# Patient Record
Sex: MALE | Race: WHITE | NOT HISPANIC OR LATINO | Employment: FULL TIME | URBAN - METROPOLITAN AREA
[De-identification: names, ages, dates, MRNs, and addresses within clinical notes are randomized per-mention and may not be internally consistent; named-entity substitution may affect disease eponyms.]

---

## 2018-06-06 ENCOUNTER — HISTORICAL (OUTPATIENT)
Dept: ADMINISTRATIVE | Facility: HOSPITAL | Age: 53
End: 2018-06-06

## 2018-06-06 LAB
ALBUMIN SERPL-MCNC: 4.8 GM/DL (ref 3.4–5)
ALBUMIN/GLOB SERPL: 1.92 {RATIO} (ref 1.5–2.5)
ALP SERPL-CCNC: 67 UNIT/L (ref 38–126)
ALT SERPL-CCNC: 12 UNIT/L (ref 7–52)
AST SERPL-CCNC: 15 UNIT/L (ref 15–37)
BILIRUB SERPL-MCNC: 0.9 MG/DL (ref 0.2–1)
BILIRUBIN DIRECT+TOT PNL SERPL-MCNC: 0.2 MG/DL (ref 0–0.5)
BILIRUBIN DIRECT+TOT PNL SERPL-MCNC: 0.7 MG/DL
BUN SERPL-MCNC: 9 MG/DL (ref 7–18)
CALCIUM SERPL-MCNC: 9.4 MG/DL (ref 8.5–10)
CHLORIDE SERPL-SCNC: 101 MMOL/L (ref 98–107)
CO2 SERPL-SCNC: 31 MMOL/L (ref 21–32)
CREAT SERPL-MCNC: 0.65 MG/DL (ref 0.6–1.3)
EST. AVERAGE GLUCOSE BLD GHB EST-MCNC: 134 MG/DL
GLOBULIN SER-MCNC: 2.5 GM/DL (ref 1.2–3)
GLUCOSE SERPL-MCNC: 152 MG/DL (ref 74–106)
HBA1C MFR BLD: 6.3 % (ref 4.4–6.4)
POTASSIUM SERPL-SCNC: 4.4 MMOL/L (ref 3.5–5.1)
PROT SERPL-MCNC: 7.3 GM/DL (ref 6.4–8.2)
SODIUM SERPL-SCNC: 138 MMOL/L (ref 136–145)

## 2019-05-20 ENCOUNTER — HISTORICAL (OUTPATIENT)
Dept: ADMINISTRATIVE | Facility: HOSPITAL | Age: 54
End: 2019-05-20

## 2019-05-20 LAB
ABS NEUT (OLG): 5 X10(3)/MCL (ref 2.1–9.2)
ALBUMIN SERPL-MCNC: 4.7 GM/DL (ref 3.4–5)
ALBUMIN/GLOB SERPL: 2.35 {RATIO} (ref 1.5–2.5)
ALP SERPL-CCNC: 64 UNIT/L (ref 38–126)
ALT SERPL-CCNC: 16 UNIT/L (ref 7–52)
AST SERPL-CCNC: 16 UNIT/L (ref 15–37)
BILIRUB SERPL-MCNC: 0.7 MG/DL (ref 0.2–1)
BILIRUBIN DIRECT+TOT PNL SERPL-MCNC: 0.2 MG/DL (ref 0–0.5)
BILIRUBIN DIRECT+TOT PNL SERPL-MCNC: 0.5 MG/DL
BUN SERPL-MCNC: 10 MG/DL (ref 7–18)
CALCIUM SERPL-MCNC: 9 MG/DL (ref 8.5–10)
CHLORIDE SERPL-SCNC: 101 MMOL/L (ref 98–107)
CHOLEST SERPL-MCNC: 130 MG/DL (ref 0–200)
CHOLEST/HDLC SERPL: 3.4 {RATIO}
CO2 SERPL-SCNC: 29 MMOL/L (ref 21–32)
CREAT SERPL-MCNC: 0.69 MG/DL (ref 0.6–1.3)
CREAT UR-MCNC: 200 MG/DL
ERYTHROCYTE [DISTWIDTH] IN BLOOD BY AUTOMATED COUNT: 13.8 % (ref 11.5–17)
EST. AVERAGE GLUCOSE BLD GHB EST-MCNC: 212 MG/DL
GLOBULIN SER-MCNC: 2 GM/DL (ref 1.2–3)
GLUCOSE SERPL-MCNC: 185 MG/DL (ref 74–106)
HBA1C MFR BLD: 9 % (ref 4.4–6.4)
HCT VFR BLD AUTO: 40.3 % (ref 42–52)
HDLC SERPL-MCNC: 38 MG/DL (ref 35–60)
HGB BLD-MCNC: 13.6 GM/DL (ref 14–18)
LDLC SERPL CALC-MCNC: 78 MG/DL (ref 0–129)
LYMPHOCYTES # BLD AUTO: 2.5 X10(3)/MCL (ref 0.6–3.4)
LYMPHOCYTES NFR BLD AUTO: 30.4 % (ref 13–40)
MCH RBC QN AUTO: 27.9 PG (ref 27–31.2)
MCHC RBC AUTO-ENTMCNC: 34 GM/DL (ref 32–36)
MCV RBC AUTO: 83 FL (ref 80–94)
MICROALBUMIN UR-MCNC: 150 MG/L
MICROALBUMIN/CREAT RATIO PNL UR: ABNORMAL MG/GM
MONOCYTES # BLD AUTO: 0.6 X10(3)/MCL (ref 0.1–1.3)
MONOCYTES NFR BLD AUTO: 8 % (ref 0.1–24)
NEUTROPHILS NFR BLD AUTO: 61.6 % (ref 47–80)
PLATELET # BLD AUTO: 166 X10(3)/MCL (ref 130–400)
PMV BLD AUTO: 10.4 FL (ref 9.4–12.4)
POTASSIUM SERPL-SCNC: 4.3 MMOL/L (ref 3.5–5.1)
PROT SERPL-MCNC: 6.7 GM/DL (ref 6.4–8.2)
PSA SERPL-MCNC: 0.91 NG/ML (ref 0–3.5)
RBC # BLD AUTO: 4.87 X10(6)/MCL (ref 4.7–6.1)
SODIUM SERPL-SCNC: 138 MMOL/L (ref 136–145)
TRIGL SERPL-MCNC: 89 MG/DL (ref 30–150)
TSH SERPL-ACNC: 2.04 MIU/ML (ref 0.35–4.94)
VLDLC SERPL CALC-MCNC: 17.8 MG/DL
WBC # SPEC AUTO: 8.1 X10(3)/MCL (ref 4.5–11.5)

## 2019-09-03 ENCOUNTER — HISTORICAL (OUTPATIENT)
Dept: ADMINISTRATIVE | Facility: HOSPITAL | Age: 54
End: 2019-09-03

## 2019-09-03 LAB
ALBUMIN SERPL-MCNC: 4.7 GM/DL (ref 3.4–5)
ALBUMIN/GLOB SERPL: 1.88 {RATIO} (ref 1.5–2.5)
ALP SERPL-CCNC: 58 UNIT/L (ref 38–126)
ALT SERPL-CCNC: 14 UNIT/L (ref 7–52)
AST SERPL-CCNC: 14 UNIT/L (ref 15–37)
BILIRUB SERPL-MCNC: 0.6 MG/DL (ref 0.2–1)
BILIRUBIN DIRECT+TOT PNL SERPL-MCNC: 0.1 MG/DL (ref 0–0.5)
BILIRUBIN DIRECT+TOT PNL SERPL-MCNC: 0.5 MG/DL
BUN SERPL-MCNC: 30 MG/DL (ref 7–18)
CALCIUM SERPL-MCNC: 8.9 MG/DL (ref 8.5–10)
CHLORIDE SERPL-SCNC: 101 MMOL/L (ref 98–107)
CO2 SERPL-SCNC: 27 MMOL/L (ref 21–32)
CREAT SERPL-MCNC: 1.13 MG/DL (ref 0.6–1.3)
EST. AVERAGE GLUCOSE BLD GHB EST-MCNC: 206 MG/DL
GLOBULIN SER-MCNC: 2.5 GM/DL (ref 1.2–3)
GLUCOSE SERPL-MCNC: 171 MG/DL (ref 74–106)
HBA1C MFR BLD: 8.8 % (ref 4.4–6.4)
POTASSIUM SERPL-SCNC: 4.2 MMOL/L (ref 3.5–5.1)
PROT SERPL-MCNC: 7.2 GM/DL (ref 6.4–8.2)
SODIUM SERPL-SCNC: 135 MMOL/L (ref 136–145)

## 2020-01-22 ENCOUNTER — HISTORICAL (OUTPATIENT)
Dept: ADMINISTRATIVE | Facility: HOSPITAL | Age: 55
End: 2020-01-22

## 2020-01-22 LAB
ALBUMIN SERPL-MCNC: 4.3 GM/DL (ref 3.4–5)
ALBUMIN/GLOB SERPL: 1.95 {RATIO} (ref 1.5–2.5)
ALP SERPL-CCNC: 62 UNIT/L (ref 38–126)
ALT SERPL-CCNC: 17 UNIT/L (ref 7–52)
AST SERPL-CCNC: 18 UNIT/L (ref 15–37)
BILIRUB SERPL-MCNC: 0.6 MG/DL (ref 0.2–1)
BILIRUBIN DIRECT+TOT PNL SERPL-MCNC: 0.1 MG/DL (ref 0–0.5)
BILIRUBIN DIRECT+TOT PNL SERPL-MCNC: 0.5 MG/DL
BUN SERPL-MCNC: 15 MG/DL (ref 7–18)
CALCIUM SERPL-MCNC: 9.1 MG/DL (ref 8.5–10)
CHLORIDE SERPL-SCNC: 99 MMOL/L (ref 98–107)
CO2 SERPL-SCNC: 32 MMOL/L (ref 21–32)
CREAT SERPL-MCNC: 0.94 MG/DL (ref 0.6–1.3)
EST. AVERAGE GLUCOSE BLD GHB EST-MCNC: 148 MG/DL
GLOBULIN SER-MCNC: 2.2 GM/DL (ref 1.2–3)
GLUCOSE SERPL-MCNC: 117 MG/DL (ref 74–106)
HBA1C MFR BLD: 6.8 % (ref 4.4–6.4)
POTASSIUM SERPL-SCNC: 3.9 MMOL/L (ref 3.5–5.1)
PROT SERPL-MCNC: 6.5 GM/DL (ref 6.4–8.2)
SODIUM SERPL-SCNC: 137 MMOL/L (ref 136–145)

## 2020-05-27 ENCOUNTER — HISTORICAL (OUTPATIENT)
Dept: ADMINISTRATIVE | Facility: HOSPITAL | Age: 55
End: 2020-05-27

## 2020-05-27 LAB
ABS NEUT (OLG): 4.2 X10(3)/MCL (ref 2.1–9.2)
ALBUMIN SERPL-MCNC: 4.5 GM/DL (ref 3.4–5)
ALBUMIN/GLOB SERPL: 1.73 {RATIO} (ref 1.5–2.5)
ALP SERPL-CCNC: 61 UNIT/L (ref 38–126)
ALT SERPL-CCNC: 15 UNIT/L (ref 7–52)
AST SERPL-CCNC: 18 UNIT/L (ref 15–37)
BILIRUB SERPL-MCNC: 0.6 MG/DL (ref 0.2–1)
BILIRUBIN DIRECT+TOT PNL SERPL-MCNC: 0.2 MG/DL (ref 0–0.5)
BILIRUBIN DIRECT+TOT PNL SERPL-MCNC: 0.4 MG/DL
BUN SERPL-MCNC: 23 MG/DL (ref 7–18)
CALCIUM SERPL-MCNC: 9.1 MG/DL (ref 8.5–10)
CHLORIDE SERPL-SCNC: 104 MMOL/L (ref 98–107)
CHOLEST SERPL-MCNC: 115 MG/DL (ref 0–200)
CHOLEST/HDLC SERPL: 3.3 {RATIO}
CO2 SERPL-SCNC: 30 MMOL/L (ref 21–32)
CREAT SERPL-MCNC: 1.18 MG/DL (ref 0.6–1.3)
CREAT UR-MCNC: 50 MG/DL
ERYTHROCYTE [DISTWIDTH] IN BLOOD BY AUTOMATED COUNT: 13.3 % (ref 11.5–17)
EST. AVERAGE GLUCOSE BLD GHB EST-MCNC: 160 MG/DL
FERRITIN SERPL-MCNC: 272.31 NG/ML (ref 21.81–274.66)
GLOBULIN SER-MCNC: 2.6 GM/DL (ref 1.2–3)
GLUCOSE SERPL-MCNC: 115 MG/DL (ref 74–106)
HBA1C MFR BLD: 7.2 % (ref 4.4–6.4)
HCT VFR BLD AUTO: 35.4 % (ref 42–52)
HDLC SERPL-MCNC: 35 MG/DL (ref 35–60)
HGB BLD-MCNC: 11.8 GM/DL (ref 14–18)
IRON SATN MFR SERPL: 22 % (ref 20–50)
IRON SERPL-MCNC: 60 UG/DL (ref 65–175)
LDLC SERPL CALC-MCNC: 56 MG/DL (ref 0–129)
LYMPHOCYTES # BLD AUTO: 1.8 X10(3)/MCL (ref 0.6–3.4)
LYMPHOCYTES NFR BLD AUTO: 26.9 % (ref 13–40)
MCH RBC QN AUTO: 27.7 PG (ref 27–31.2)
MCHC RBC AUTO-ENTMCNC: 33 GM/DL (ref 32–36)
MCV RBC AUTO: 83 FL (ref 80–94)
MICROALBUMIN UR-MCNC: 150 MG/L
MICROALBUMIN/CREAT RATIO PNL UR: >300 MG/GM
MONOCYTES # BLD AUTO: 0.6 X10(3)/MCL (ref 0.1–1.3)
MONOCYTES NFR BLD AUTO: 9.6 % (ref 0.1–24)
NEUTROPHILS NFR BLD AUTO: 63.5 % (ref 47–80)
PLATELET # BLD AUTO: 178 X10(3)/MCL (ref 130–400)
PMV BLD AUTO: 11.1 FL (ref 9.4–12.4)
POTASSIUM SERPL-SCNC: 4.5 MMOL/L (ref 3.5–5.1)
PROT SERPL-MCNC: 7.1 GM/DL (ref 6.4–8.2)
PSA SERPL-MCNC: 0.92 NG/ML (ref 0–3.5)
RBC # BLD AUTO: 4.26 X10(6)/MCL (ref 4.7–6.1)
RET# (OHS): 0.06 X10^6/ML (ref 0.03–0.1)
RETICULOCYTE COUNT AUTOMATED (OLG): 1.4 % (ref 1.1–2.1)
SODIUM SERPL-SCNC: 141 MMOL/L (ref 136–145)
TIBC SERPL-MCNC: 219 UG/DL (ref 69–240)
TIBC SERPL-MCNC: 279 UG/DL (ref 250–450)
TRANSFERRIN SERPL-MCNC: 254 MG/DL (ref 174–364)
TRIGL SERPL-MCNC: 180 MG/DL (ref 30–150)
TSH SERPL-ACNC: 1.56 MIU/ML (ref 0.35–4.94)
VLDLC SERPL CALC-MCNC: 36 MG/DL
WBC # SPEC AUTO: 6.6 X10(3)/MCL (ref 4.5–11.5)

## 2020-07-01 ENCOUNTER — HISTORICAL (OUTPATIENT)
Dept: ADMINISTRATIVE | Facility: HOSPITAL | Age: 55
End: 2020-07-01

## 2020-07-01 LAB
HEMOCCULT SP1 STL QL: NEGATIVE
HEMOCCULT SP2 STL QL: NEGATIVE

## 2020-08-10 ENCOUNTER — HISTORICAL (OUTPATIENT)
Dept: ADMINISTRATIVE | Facility: HOSPITAL | Age: 55
End: 2020-08-10

## 2020-08-10 LAB
ABS NEUT (OLG): 4.1 X10(3)/MCL (ref 2.1–9.2)
ALBUMIN SERPL-MCNC: 4.7 GM/DL (ref 3.4–5)
ALBUMIN/GLOB SERPL: 1.96 {RATIO} (ref 1.5–2.5)
ALP SERPL-CCNC: 65 UNIT/L (ref 38–126)
ALT SERPL-CCNC: 14 UNIT/L (ref 7–52)
AST SERPL-CCNC: 15 UNIT/L (ref 15–37)
BILIRUB SERPL-MCNC: 0.4 MG/DL (ref 0.2–1)
BILIRUBIN DIRECT+TOT PNL SERPL-MCNC: 0.1 MG/DL (ref 0–0.5)
BILIRUBIN DIRECT+TOT PNL SERPL-MCNC: 0.3 MG/DL
BUN SERPL-MCNC: 26 MG/DL (ref 7–18)
CALCIUM SERPL-MCNC: 9.3 MG/DL (ref 8.5–10.1)
CHLORIDE SERPL-SCNC: 103 MMOL/L (ref 98–107)
CO2 SERPL-SCNC: 28 MMOL/L (ref 21–32)
CREAT SERPL-MCNC: 1.45 MG/DL (ref 0.6–1.3)
ERYTHROCYTE [DISTWIDTH] IN BLOOD BY AUTOMATED COUNT: 12.9 % (ref 11.5–17)
EST. AVERAGE GLUCOSE BLD GHB EST-MCNC: 148 MG/DL
GLOBULIN SER-MCNC: 2.4 GM/DL (ref 1.2–3)
GLUCOSE SERPL-MCNC: 146 MG/DL (ref 74–106)
HBA1C MFR BLD: 6.8 % (ref 4.4–6.4)
HCT VFR BLD AUTO: 34.9 % (ref 42–52)
HGB BLD-MCNC: 11.5 GM/DL (ref 14–18)
LYMPHOCYTES # BLD AUTO: 2.5 X10(3)/MCL (ref 0.6–3.4)
LYMPHOCYTES NFR BLD AUTO: 33.5 % (ref 13–40)
MCH RBC QN AUTO: 27.8 PG (ref 27–31.2)
MCHC RBC AUTO-ENTMCNC: 33 GM/DL (ref 32–36)
MCV RBC AUTO: 84 FL (ref 80–94)
MONOCYTES # BLD AUTO: 0.8 X10(3)/MCL (ref 0.1–1.3)
MONOCYTES NFR BLD AUTO: 10.2 % (ref 0.1–24)
NEUTROPHILS NFR BLD AUTO: 56.3 % (ref 47–80)
PLATELET # BLD AUTO: 185 X10(3)/MCL (ref 130–400)
PMV BLD AUTO: 10.8 FL (ref 9.4–12.4)
POTASSIUM SERPL-SCNC: 4.7 MMOL/L (ref 3.5–5.1)
PROT SERPL-MCNC: 7.1 GM/DL (ref 6.4–8.2)
RBC # BLD AUTO: 4.13 X10(6)/MCL (ref 4.7–6.1)
SODIUM SERPL-SCNC: 139 MMOL/L (ref 136–145)
WBC # SPEC AUTO: 7.4 X10(3)/MCL (ref 4.5–11.5)

## 2020-08-17 ENCOUNTER — HISTORICAL (OUTPATIENT)
Dept: ADMINISTRATIVE | Facility: HOSPITAL | Age: 55
End: 2020-08-17

## 2020-08-17 LAB
FERRITIN SERPL-MCNC: 239.57 NG/ML (ref 21.81–274.66)
IRON SATN MFR SERPL: 21 % (ref 20–50)
IRON SERPL-MCNC: 58 UG/DL (ref 65–175)
RET# (OHS): 0.05 X10^6/ML (ref 0.03–0.1)
RETICULOCYTE COUNT AUTOMATED (OLG): 1.2 % (ref 1.1–2.1)
TIBC SERPL-MCNC: 217 UG/DL (ref 69–240)
TIBC SERPL-MCNC: 275 UG/DL (ref 250–450)
TRANSFERRIN SERPL-MCNC: 251 MG/DL (ref 174–364)

## 2020-12-01 ENCOUNTER — HISTORICAL (OUTPATIENT)
Dept: ADMINISTRATIVE | Facility: HOSPITAL | Age: 55
End: 2020-12-01

## 2020-12-01 LAB
ABS NEUT (OLG): 4.7 X10(3)/MCL (ref 2.1–9.2)
ALBUMIN SERPL-MCNC: 4.6 GM/DL (ref 3.4–5)
ALBUMIN/GLOB SERPL: 1.7 {RATIO} (ref 1.5–2.5)
ALP SERPL-CCNC: 77 UNIT/L (ref 38–126)
ALT SERPL-CCNC: 11 UNIT/L (ref 7–52)
AST SERPL-CCNC: 14 UNIT/L (ref 15–37)
BILIRUB SERPL-MCNC: 0.7 MG/DL (ref 0.2–1)
BILIRUBIN DIRECT+TOT PNL SERPL-MCNC: 0.1 MG/DL (ref 0–0.5)
BILIRUBIN DIRECT+TOT PNL SERPL-MCNC: 0.6 MG/DL
BUN SERPL-MCNC: 21 MG/DL (ref 7–18)
CALCIUM SERPL-MCNC: 9.6 MG/DL (ref 8.5–10.1)
CHLORIDE SERPL-SCNC: 102 MMOL/L (ref 98–107)
CO2 SERPL-SCNC: 29 MMOL/L (ref 21–32)
CREAT SERPL-MCNC: 1.29 MG/DL (ref 0.6–1.3)
ERYTHROCYTE [DISTWIDTH] IN BLOOD BY AUTOMATED COUNT: 12.8 % (ref 11.5–17)
EST. AVERAGE GLUCOSE BLD GHB EST-MCNC: 140 MG/DL
GLOBULIN SER-MCNC: 2.7 GM/DL (ref 1.2–3)
GLUCOSE SERPL-MCNC: 175 MG/DL (ref 74–106)
HBA1C MFR BLD: 6.5 % (ref 4.4–6.4)
HCT VFR BLD AUTO: 38.3 % (ref 42–52)
HGB BLD-MCNC: 12.5 GM/DL (ref 14–18)
LYMPHOCYTES # BLD AUTO: 1.9 X10(3)/MCL (ref 0.6–3.4)
LYMPHOCYTES NFR BLD AUTO: 26.4 % (ref 13–40)
MCH RBC QN AUTO: 27.5 PG (ref 27–31.2)
MCHC RBC AUTO-ENTMCNC: 33 GM/DL (ref 32–36)
MCV RBC AUTO: 84 FL (ref 80–94)
MONOCYTES # BLD AUTO: 0.6 X10(3)/MCL (ref 0.1–1.3)
MONOCYTES NFR BLD AUTO: 8.7 % (ref 0.1–24)
NEUTROPHILS NFR BLD AUTO: 64.9 % (ref 47–80)
PLATELET # BLD AUTO: 175 X10(3)/MCL (ref 130–400)
PMV BLD AUTO: 10.8 FL (ref 9.4–12.4)
POTASSIUM SERPL-SCNC: 4.8 MMOL/L (ref 3.5–5.1)
PROT SERPL-MCNC: 7.3 GM/DL (ref 6.4–8.2)
RBC # BLD AUTO: 4.54 X10(6)/MCL (ref 4.7–6.1)
SODIUM SERPL-SCNC: 139 MMOL/L (ref 136–145)
WBC # SPEC AUTO: 7.2 X10(3)/MCL (ref 4.5–11.5)

## 2021-06-10 ENCOUNTER — HISTORICAL (OUTPATIENT)
Dept: ADMINISTRATIVE | Facility: HOSPITAL | Age: 56
End: 2021-06-10

## 2021-06-10 LAB
ABS NEUT (OLG): 5.5 X10(3)/MCL (ref 2.1–9.2)
ALBUMIN SERPL-MCNC: 4.8 GM/DL (ref 3.4–5)
ALBUMIN/GLOB SERPL: 1.92 {RATIO} (ref 1.5–2.5)
ALP SERPL-CCNC: 69 UNIT/L (ref 38–126)
ALT SERPL-CCNC: 12 UNIT/L (ref 7–52)
AST SERPL-CCNC: 14 UNIT/L (ref 15–37)
BILIRUB SERPL-MCNC: 0.6 MG/DL (ref 0.2–1)
BILIRUBIN DIRECT+TOT PNL SERPL-MCNC: 0.1 MG/DL (ref 0–0.5)
BILIRUBIN DIRECT+TOT PNL SERPL-MCNC: 0.5 MG/DL
BUN SERPL-MCNC: 31 MG/DL (ref 7–18)
CALCIUM SERPL-MCNC: 9.3 MG/DL (ref 8.5–10.1)
CHLORIDE SERPL-SCNC: 101 MMOL/L (ref 98–107)
CHOLEST SERPL-MCNC: 128 MG/DL (ref 0–200)
CHOLEST/HDLC SERPL: 3.5 {RATIO}
CO2 SERPL-SCNC: 28 MMOL/L (ref 21–32)
CREAT SERPL-MCNC: 1.45 MG/DL (ref 0.6–1.3)
CREAT UR-MCNC: 200 MG/DL
ERYTHROCYTE [DISTWIDTH] IN BLOOD BY AUTOMATED COUNT: 12.8 % (ref 11.5–17)
EST. AVERAGE GLUCOSE BLD GHB EST-MCNC: 134 MG/DL
GLOBULIN SER-MCNC: 2.6 GM/DL (ref 1.2–3)
GLUCOSE SERPL-MCNC: 119 MG/DL (ref 74–106)
HBA1C MFR BLD: 6.3 % (ref 4.4–6.4)
HCT VFR BLD AUTO: 34.7 % (ref 42–52)
HDLC SERPL-MCNC: 37 MG/DL (ref 35–60)
HGB BLD-MCNC: 11.5 GM/DL (ref 14–18)
LDLC SERPL CALC-MCNC: 63 MG/DL (ref 0–129)
LYMPHOCYTES # BLD AUTO: 2 X10(3)/MCL (ref 0.6–3.4)
LYMPHOCYTES NFR BLD AUTO: 24 % (ref 13–40)
MCH RBC QN AUTO: 28 PG (ref 27–31.2)
MCHC RBC AUTO-ENTMCNC: 33 GM/DL (ref 32–36)
MCV RBC AUTO: 84 FL (ref 80–94)
MICROALBUMIN UR-MCNC: 150 MG/L
MICROALBUMIN/CREAT RATIO PNL UR: ABNORMAL MG/GM
MONOCYTES # BLD AUTO: 0.7 X10(3)/MCL (ref 0.1–1.3)
MONOCYTES NFR BLD AUTO: 8.4 % (ref 0.1–24)
NEUTROPHILS NFR BLD AUTO: 67.6 % (ref 47–80)
PLATELET # BLD AUTO: 170 X10(3)/MCL (ref 130–400)
PMV BLD AUTO: 10.6 FL (ref 9.4–12.4)
POTASSIUM SERPL-SCNC: 4.3 MMOL/L (ref 3.5–5.1)
PROT SERPL-MCNC: 7.3 GM/DL (ref 6.4–8.2)
PSA SERPL-MCNC: 1.06 NG/ML (ref 0–3.5)
RBC # BLD AUTO: 4.11 X10(6)/MCL (ref 4.7–6.1)
SODIUM SERPL-SCNC: 141 MMOL/L (ref 136–145)
TESTOST SERPL-MCNC: 240 NG/DL (ref 300–1060)
TRIGL SERPL-MCNC: 234 MG/DL (ref 30–150)
TSH SERPL-ACNC: 1.72 MIU/ML (ref 0.35–4.94)
VLDLC SERPL CALC-MCNC: 46.8 MG/DL
WBC # SPEC AUTO: 8.2 X10(3)/MCL (ref 4.5–11.5)

## 2021-07-13 ENCOUNTER — HISTORICAL (OUTPATIENT)
Dept: ADMINISTRATIVE | Facility: HOSPITAL | Age: 56
End: 2021-07-13

## 2021-07-13 LAB
BUN SERPL-MCNC: 29 MG/DL (ref 7–18)
CALCIUM SERPL-MCNC: 9.4 MG/DL (ref 8.5–10.1)
CHLORIDE SERPL-SCNC: 100 MMOL/L (ref 98–107)
CO2 SERPL-SCNC: 29 MMOL/L (ref 21–32)
CREAT SERPL-MCNC: 1.71 MG/DL (ref 0.6–1.3)
CREAT/UREA NIT SERPL: 17
GLUCOSE SERPL-MCNC: 136 MG/DL (ref 74–106)
POTASSIUM SERPL-SCNC: 4.6 MMOL/L (ref 3.5–5.1)
SODIUM SERPL-SCNC: 139 MMOL/L (ref 136–145)
TESTOST SERPL-MCNC: 291 NG/DL (ref 300–1060)

## 2021-08-04 ENCOUNTER — HISTORICAL (OUTPATIENT)
Dept: ADMINISTRATIVE | Facility: HOSPITAL | Age: 56
End: 2021-08-04

## 2021-08-04 LAB
APPEARANCE, UA: CLEAR
BACTERIA #/AREA URNS AUTO: ABNORMAL /HPF
BILIRUB UR QL STRIP: NEGATIVE MG/DL
BUN SERPL-MCNC: 13 MG/DL (ref 7–18)
CALCIUM SERPL-MCNC: 8.8 MG/DL (ref 8.5–10.1)
CHLORIDE SERPL-SCNC: 104 MMOL/L (ref 98–107)
CO2 SERPL-SCNC: 30 MMOL/L (ref 21–32)
COLOR UR: YELLOW
CREAT SERPL-MCNC: 1.23 MG/DL (ref 0.6–1.3)
CREAT/UREA NIT SERPL: 10.6
GLUCOSE (UA): ABNORMAL MG/DL
GLUCOSE SERPL-MCNC: 106 MG/DL (ref 74–106)
HGB UR QL STRIP: ABNORMAL UNIT/L
KETONES UR QL STRIP: ABNORMAL MG/DL
LEUKOCYTE ESTERASE UR QL STRIP: NEGATIVE UNIT/L
NITRITE UR QL STRIP.AUTO: NEGATIVE
PH UR STRIP: 5 [PH]
POTASSIUM SERPL-SCNC: 4.4 MMOL/L (ref 3.5–5.1)
PROT UR QL STRIP: ABNORMAL MG/DL
RBC #/AREA URNS HPF: ABNORMAL /HPF
SODIUM SERPL-SCNC: 141 MMOL/L (ref 136–145)
SP GR UR STRIP: 1.02
SQUAMOUS EPITHELIAL, UA: ABNORMAL /LPF
UROBILINOGEN UR STRIP-ACNC: 0.2 MG/DL
WBC #/AREA URNS AUTO: ABNORMAL /[HPF]

## 2021-12-08 ENCOUNTER — HISTORICAL (OUTPATIENT)
Dept: ADMINISTRATIVE | Facility: HOSPITAL | Age: 56
End: 2021-12-08

## 2021-12-08 LAB
ABS NEUT (OLG): 4.3 X10(3)/MCL (ref 2.1–9.2)
ALBUMIN SERPL-MCNC: 4.7 GM/DL (ref 3.4–5)
ALBUMIN/GLOB SERPL: 1.96 {RATIO} (ref 1.5–2.5)
ALP SERPL-CCNC: 65 UNIT/L (ref 38–126)
ALT SERPL-CCNC: 16 UNIT/L (ref 7–52)
AST SERPL-CCNC: 18 UNIT/L (ref 15–37)
BILIRUB SERPL-MCNC: 0.4 MG/DL (ref 0.2–1)
BILIRUBIN DIRECT+TOT PNL SERPL-MCNC: 0.1 MG/DL (ref 0–0.5)
BILIRUBIN DIRECT+TOT PNL SERPL-MCNC: 0.3 MG/DL
BUN SERPL-MCNC: 20 MG/DL (ref 7–18)
CALCIUM SERPL-MCNC: 9.6 MG/DL (ref 8.5–10.1)
CHLORIDE SERPL-SCNC: 100 MMOL/L (ref 98–107)
CHOLEST SERPL-MCNC: 125 MG/DL (ref 0–200)
CHOLEST/HDLC SERPL: 3.3 {RATIO}
CO2 SERPL-SCNC: 31 MMOL/L (ref 21–32)
CREAT SERPL-MCNC: 1.37 MG/DL (ref 0.6–1.3)
ERYTHROCYTE [DISTWIDTH] IN BLOOD BY AUTOMATED COUNT: 13.6 % (ref 11.5–17)
EST CREAT CLEARANCE SER (OHS): 78.77 ML/MIN
EST. AVERAGE GLUCOSE BLD GHB EST-MCNC: 146 MG/DL
GLOBULIN SER-MCNC: 2.4 GM/DL (ref 1.2–3)
GLUCOSE SERPL-MCNC: 147 MG/DL (ref 74–106)
HBA1C MFR BLD: 6.7 % (ref 4.4–6.4)
HCT VFR BLD AUTO: 41.3 % (ref 42–52)
HDLC SERPL-MCNC: 38 MG/DL (ref 35–60)
HGB BLD-MCNC: 12.8 GM/DL (ref 14–18)
LDLC SERPL CALC-MCNC: 56 MG/DL (ref 0–129)
LYMPHOCYTES # BLD AUTO: 1.7 X10(3)/MCL (ref 0.6–3.4)
LYMPHOCYTES NFR BLD AUTO: 26.5 % (ref 13–40)
MCH RBC QN AUTO: 26 PG (ref 27–31.2)
MCHC RBC AUTO-ENTMCNC: 31 GM/DL (ref 32–36)
MCV RBC AUTO: 84 FL (ref 80–94)
MONOCYTES # BLD AUTO: 0.6 X10(3)/MCL (ref 0.1–1.3)
MONOCYTES NFR BLD AUTO: 9.7 % (ref 0.1–24)
NEUTROPHILS NFR BLD AUTO: 63.8 % (ref 47–80)
PLATELET # BLD AUTO: 172 X10(3)/MCL (ref 130–400)
PMV BLD AUTO: 11.5 FL (ref 9.4–12.4)
POTASSIUM SERPL-SCNC: 5.1 MMOL/L (ref 3.5–5.1)
PROT SERPL-MCNC: 7.1 GM/DL (ref 6.4–8.2)
RBC # BLD AUTO: 4.92 X10(6)/MCL (ref 4.7–6.1)
SODIUM SERPL-SCNC: 139 MMOL/L (ref 136–145)
TESTOST SERPL-MCNC: 263 NG/DL (ref 300–1060)
TRIGL SERPL-MCNC: 240 MG/DL (ref 30–150)
VLDLC SERPL CALC-MCNC: 48 MG/DL
WBC # SPEC AUTO: 6.6 X10(3)/MCL (ref 4.5–11.5)

## 2022-05-02 NOTE — HISTORICAL [facility]
This is a historical note converted from Rey. Formatting and pictures may have been removed.  Please reference Rey for original formatting and attached multimedia. Chief Complaint  cpx fasting  History of Present Illness  53?year old male presents for wellness visit.  Blood Pressure - 162/102  BMI - 27.82  Immunizations - Denies Pneumonia vaccine  Prostate?Cancer Screening - PSA and DENNIS today  Colon Cancer Screening - Denies  Diet - Average  Exercise - Walking intermittently  HTN -? BP consistently elevated 150/80 average at home?despite compliance with lisinopril 10 mg daily.  DM II - HbA1C 6.3 June 2018, Pt saw podiatrist, Dr. Barnes?in Feb and had foot exam performed and dx with peripheral neuropathy.? Patient was started on gabapentin?although had to discontinue due to?decreased?focus and attention which was affecting work performance?and symptoms resolved?since discontinuing gabapentin.? Continued?burning bilateral plantar foot pain?which occurs?throughout the day.  Fasting -180.? Compliant with Metformin.? Tolerating well without side effects.?  GERD - Controlled with omeprazole.??Patient to taper off omeprazole although began to experience?pyrosis daily.  Hyperchol - Last LDL 81 2/22/18.? Tolerating Crestor 20 mg daily well without any side effects.  Chronic?bilateral calf?myalgias.? Denies any?symptoms of claudication?or edema?associated.  Review of Systems  Constitutional:?No weight loss, no fever, no fatigue, no chills, no night sweats,?no weakness  Eyes:?No blurred vision,?no redness,?no drainage,?no ocular?pain  HEENT:?No sore throat,?no ear pain, no sinus pressure, no nasal congestion, no rhinorrhea, no postnasal drip  Respiratory:?No cough, no wheezing, no sputum production, no shortness of breath  Cardiovascular:?No chest pain, no palpitations, no dyspnea on exertion,?no orthopnea  Gastrointestinal:?No nausea, no vomiting, no abdominal pain, no diarrhea,?no constipation, no melena,?no  hematochezia  Genitourinary:?No dysuria, no hematuria, no frequency, no urgency, no incontinence, no discharge  Musculoskeletal:?myalgias, no arthralgias, no weakness, no joint effusion, no edema  Integumentary:?No rashes, no hives, no itching, no lesions, no jaundice  Neurologic:?No headaches, no numbness, no tingling, no weakness, no dizziness  Psychiatric:?No anxiety, no irritability, no depression,?no suicidal ideations, no?homicidal ideations,?no delusions, no hallucinations  Endocrine:?No polyuria, no polydipsia, no polyphagia  Hematology:?No bruising, no lymphadenopathy,?no paleness  ?  Physical Exam  Vitals & Measurements  HR:?70(Peripheral)? BP:?162/102?  HT:?185?cm? WT:?95.2?kg? BMI:?27.82?  General:?Well developed, Well-nourished, in No acute distress, A&O x 4  Eye:?PERRLA, EOMI, Clear conjunctiva, Eyelids unremarkable  Ears:?Bilateral EAC clear?and?Bilateral TM clear  Nose:? Mucosa normal, No rhinorrhea, No lesions  O/P:??No?erythema,?No tonsillar hypertrophy,?No tonsillar exudates,?No cobblestoning  Neck:?Soft, Nontender, No thyromegaly, Full range of motion, No cervical lymphadenopathy, No JVD  Cardiovascular:?Regular rate and rhythm, No murmurs, No gallops, No rubs  Respiratory:?Clear to auscultation bilaterally, No wheezes, No rhonchi, No?crackles  Abdomen:? Soft, Nontender, No hepatosplenomegaly, Normal and equal bowel sounds, No masses, No rebound, No guarding  Musculoskeletal:? bilateral calf tenderness, FROM, No joint abnormality, No clubbing, No cyanosis,No?edema  Neurologic:? No motor or sensory deficits, Reflexes 2+ throughout, CN II-XII grossly intact  Integumentary:?No rashes or skin lesions  DENNIS - No anal growths or lesions, No rectal masses, Normal sphincter tone, No prostate nodules,? Symmetric, No tenderness,? prostate firm, prostate [id number]  Assessment/Plan  1.?Wellness examination?Z00.00  ?CBC, CMP, FLP, TSH today  Discussed the?importance of maintaining a balanced diet and regular  exercise  Ordered:  Comprehensive Metabolic Panel, Routine collect, 05/20/19 10:28:00 CDT, Blood, Stop date 05/20/19 10:28:00 CDT, Lab Collect, Wellness examination, 05/20/19 10:28:00 CDT  External Referral, Screening Colonoscopy, GI, Dr. Salazar, 05/20/19 10:25:00 CDT, Wellness examination  Lipid Panel, Routine collect, 05/20/19 10:28:00 CDT, Blood, Stop date 05/20/19 10:28:00 CDT, Lab Collect, Wellness examination, 05/20/19 10:28:00 CDT  Preventative Health Care Est 40-64 years [id number] PC, Wellness examination, [facility], 05/20/19 10:25:00 CDT  ?  2.?DMII (diabetes mellitus, type 2)?E11.9  ?Hemoglobin A1c and microalbumin today  Ordered:  Office/Outpatient Visit Level 3 Established [id number] PC, DMII (diabetes mellitus, type 2)  HTN (hypertension)  Hypercholesterolemia  GERD - Gastro-esophageal reflux disease  Calf pain  Peripheral neuropathy, [facility], 05/20/19 10:25:00 CDT  ?  3.?HTN (hypertension)?I10  ?Increase lisinopril to 20 mg daily  Ordered:  Clinic Follow up, *Est. 06/20/19 3:00:00 CDT, Order for future visit, Peripheral neuropathy  HTN (hypertension), [facility]  Office/Outpatient Visit Level 3 Established [id number] PC, DMII (diabetes mellitus, type 2)  HTN (hypertension)  Hypercholesterolemia  GERD - Gastro-esophageal reflux disease  Calf pain  Peripheral neuropathy, [facility], 05/20/19 10:25:00 CDT  ?  4.?Hypercholesterolemia?E78.00  ?CMP and FLP today  Ordered:  Office/Outpatient Visit Level 3 Established [id number] PC, DMII (diabetes mellitus, type 2)  HTN (hypertension)  Hypercholesterolemia  GERD - Gastro-esophageal reflux disease  Calf pain  Peripheral neuropathy, [facility], 05/20/19 10:25:00 CDT  ?  5.?GERD - Gastro-esophageal reflux disease?K21.9  ?Continue omeprazole 40 mg daily  Ordered:  Office/Outpatient Visit Level 3 Established [id number] PC, DMII (diabetes mellitus, type 2)  HTN (hypertension)  Hypercholesterolemia  GERD - Gastro-esophageal reflux disease  Calf pain   Peripheral neuropathy, [facility], 05/20/19 10:25:00 CDT  ?  6.?Immunization due?Z23  Pneumovax today  ?  7.?Calf pain?M79.669  ?CBC, CMP and TSH today  Ordered:  Office/Outpatient Visit Level 3 Established [id number] PC, DMII (diabetes mellitus, type 2)  HTN (hypertension)  Hypercholesterolemia  GERD - Gastro-esophageal reflux disease  Calf pain  Peripheral neuropathy, [facility], 05/20/19 10:25:00 CDT  ?  8.?Prostate cancer screening?Z12.5  ?PSA and DENNIS today  ?  9.?Peripheral neuropathy?G62.9  ?Trial of?Lyrica 50 mg?twice daily to 3 times daily?and patient to call with?response  Ordered:  Clinic Follow up, *Est. 06/20/19 3:00:00 CDT, Order for future visit, Peripheral neuropathy  HTN (hypertension), [facility]  Office/Outpatient Visit Level 3 Established [id number] PC, DMII (diabetes mellitus, type 2)  HTN (hypertension)  Hypercholesterolemia  GERD - Gastro-esophageal reflux disease  Calf pain  Peripheral neuropathy, [facility], 05/20/19 10:25:00 CDT  ?  Orders:  lisinopril, 20 mg = 1 tab(s), Oral, Daily, # 90 tab(s), 3 Refill(s), Pharmacy: [facility] [place], [place]  metFORMIN, 1,000 mg = 1 tab(s), Oral, BID, # 180 tab(s), 3 Refill(s), Pharmacy: [facility] - [place], [place]  omeprazole, 40 mg = 1 cap(s), Oral, Daily, # 90 cap(s), 3 Refill(s), Pharmacy: [facility] [place], [place]  rosuvastatin, 20 mg = 1 tab(s), Oral, Daily, # 90 tab(s), 3 Refill(s), Pharmacy: [facility] [place], [place]  Referrals  External Referral, Screening Colonoscopy, GI, Dr. Salazar, 05/20/19 10:25:00 CDT, Wellness examination  Clinic Follow up, *Est. 06/20/19 3:00:00 CDT, Order for future visit, Peripheral neuropathy  HTN (hypertension), [facility]   Problem List/Past Medical History  Ongoing  Allergic rhinitis  DMII (diabetes mellitus, type 2)  GERD - Gastro-esophageal reflux disease  HTN (hypertension)  Hypercholesterolemia  LVH (left ventricular hypertrophy)  Historical  No qualifying  data  Procedure/Surgical History  cyst removed finger   Medications  Aleve 220 mg oral tablet, 1 cap(s), Oral, q8hr, PRN  aspirin 81 mg oral Delayed Release (EC) tablet, 81 mg= 1 tab(s), Oral, Daily  Centrum Silver oral tablet, 1 tab(s), Oral, Daily  garlic  lisinopril 20 mg oral tablet, 20 mg= 1 tab(s), Oral, Daily, 3 refills  metFORMIN 1000 mg oral tablet, 1000 mg= 1 tab(s), Oral, BID, 3 refills  omeprazole 40 mg oral DR capsule, 40 mg= 1 cap(s), Oral, Daily, 3 refills  rosuvastatin 20 mg oral tablet, 20 mg= 1 tab(s), Oral, Daily, 3 refills  Allergies  No Known Allergies  Social History  Alcohol  Beer, Liquor, 1-2 times per year, 02/22/2018  Employment/School  Employed, 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  CAD - Coronary artery disease: Mother.  Diabetes mellitus type 2: Mother and Father.  Hypertension.: Father.  Pulmonary embolism: Father.  Immunizations  Vaccine Date Status   pneumococcal 23-polyvalent vaccine 05/20/2019 Given   tetanus/diphtheria/pertussis, acel(Tdap) 08/10/2017 Recorded   Health Maintenance  Health Maintenance  ???Pending?(in the next year)  ??? ??OverDue  ??? ? ? ?Alcohol Misuse Screening due??01/01/19??and every 1??year(s)  ??? ? ? ?Smoking Cessation due??01/01/19??Variable frequency  ??? ? ? ?Diabetes Maintenance-Fasting Lipid Profile due??02/22/19??and every 1??year(s)  ??? ??Due?  ??? ? ? ?ADL Screening due??05/20/19??and every 1??year(s)  ??? ? ? ?Colorectal Screening due??05/20/19??and every?  ??? ? ? ?Depression Screening due??05/20/19??and every?  ??? ? ? ?Diabetes Maintenance-Eye Exam due??05/20/19??and every?  ??? ? ? ?Diabetes Maintenance-Urine Dipstick due??05/20/19??Variable frequency  ??? ? ? ?Hypertension Management-Education due??05/20/19??and every  1??year(s)  ??? ??Due In Future?  ??? ? ? ?Aspirin Therapy for CVD Prevention not due until??06/05/19??and every 1??year(s)  ??? ? ? ?Hypertension Management-BMP not due until??06/06/19??and every 1??year(s)  ??? ? ? ?Obesity Screening not due until??01/01/20??and every 1??year(s)  ??? ? ? ?Diabetes Maintenance-Foot Exam not due until??02/03/20??and every?  ??? ? ? ?Blood Pressure Screening not due until??05/19/20??and every 1??year(s)  ??? ? ? ?Body Mass Index Check not due until??05/19/20??and every 1??year(s)  ??? ? ? ?Diabetes Maintenance-HgbA1c not due until??05/19/20??and every 1??year(s)  ??? ? ? ?Hypertension Management-Blood Pressure not due until??05/19/20??and every 1??year(s)  ???Satisfied?(in the past 1 year)  ??? ??Satisfied?  ??? ? ? ?Aspirin Therapy for CVD Prevention on??06/05/18.  ??? ? ? ?Blood Pressure Screening on??05/20/19.??Satisfied by Suzy Thomas LPN  ??? ? ? ?Body Mass Index Check on??05/20/19.??Satisfied by Suzy Thomas LPN  ??? ? ? ?Diabetes Maintenance-Serum Creatinine on??05/20/19.??Satisfied by Odessa Huizar  ??? ? ? ?Diabetes Screening on??05/20/19.??Satisfied by Odessa Huizar  ??? ? ? ?Hypertension Management-BMP on??05/20/19.??Satisfied by Odessa Huizar  ??? ? ? ?Influenza Vaccine on??05/20/19.??Satisfied by Suzy Thomas LPN  ??? ? ? ?Lipid Screening on??05/20/19.??Satisfied by Odessa Huizar  ??? ? ? ?Obesity Screening on??05/20/19.??Satisfied by Suzy Thomas LPN  ?  ?

## 2022-05-02 NOTE — HISTORICAL [facility]
This is a historical note converted from Rey. Formatting and pictures may have been removed.  Please reference Rey for original formatting and attached multimedia. Chief Complaint  3 mth f/u  History of Present Illness  Hemoglobin A1c 7.6 in February.? Metformin increased to thousand milligrams twice daily. ?Tolerating well without any current side effects.? Continues to tolerate Crestor?20 mg daily well without any current side effects.? LDL close to goal?at 81 in February.? Hypertension?appears to be controlled with lisinopril 10 mg daily.? Tolerating well without any current side effects.  Persistent sores right mid leg. ?No drainage or erythema.  Increase?postnasal drip, left nasal congestion, left sinus pressure?for a few weeks despite?her knees.  Review of Systems  Constitutional:?No weight loss, no fever, no fatigue, no chills, no night sweats,?no weakness  Eyes:?No blurred vision,?no redness,?no drainage,?no ocular?pain  HEENT:?No sore throat,?no ear pain, no sinus pressure, nasal congestion, rhinorrhea, postnasal drip  Respiratory:?No cough, no wheezing, no sputum production, no shortness of breath  Cardiovascular:?No chest pain, no palpitations, no dyspnea on exertion,?no orthopnea  Gastrointestinal:?No nausea, no vomiting, no abdominal pain, no diarrhea,?no constipation, no melena,?no hematochezia  Genitourinary:?No dysuria, no hematuria, no frequency, no urgency, no incontinence, no discharge  Musculoskeletal:?No myalgias, no arthralgias, no weakness, no joint effusion, no edema  Integumentary:?No rashes, no hives, no itching, no lesions, no jaundice  Neurologic:?No headaches, no numbness, no tingling, no weakness, no dizziness  Psychiatric:?No anxiety, no irritability, no depression,?no suicidal ideations, no?homicidal ideations,?no delusions, no hallucinations  Endocrine:?No polyuria, no polydipsia, no polyphagia  Hematology:?No bruising, no lymphadenopathy,?no paleness  ?  Physical Exam  Vitals  & Measurements  HR:?80(Peripheral)? BP:?138/85?  HT:?185.45?cm? HT:?185.45?cm? WT:?86.2?kg? WT:?86.2?kg? BMI:?25.06?  General:?Well developed, Well-nourished, in No acute distress, A&O x 4  Eye:?PERRLA, EOMI, Clear conjunctiva, Eyelids unremarkable  Ears:?Bilateral EAC clear?and?Bilateral TM clear  Nose:? Mucosa boggy, No rhinorrhea, No lesions  O/P:??No?erythema,?No tonsillar hypertrophy,?No tonsillar exudates,?cobblestoning  Neck:?Soft, Nontender, No thyromegaly, Full range of motion, No cervical lymphadenopathy, No JVD  Cardiovascular:?Regular rate and rhythm, No murmurs, No gallops, No rubs  Respiratory:?Clear to auscultation bilaterally, No wheezes, No rhonchi, No?crackles  Abdomen:? Soft, Nontender, No hepatosplenomegaly, Normal and equal bowel sounds, No masses, No rebound, No guarding  Musculoskeletal:? No tenderness, FROM, No joint abnormality, No clubbing, No cyanosis,No?edema  Neurologic:? No motor or sensory deficits, Reflexes 2+ throughout, CN II-XII grossly intact  Integumentary:?Excoriated?plaques?right anterior?mid leg, generalized xerosis  ?  Assessment/Plan  1.?[place]  ?Mupirocin 1% ointment applied?3 times daily to affected area  Ordered:  Office/Outpatient Visit Level 4 Established [id number] PC, [place]  DMII (diabetes mellitus, type 2)  HTN (hypertension)  Allergic rhinitis, [facility], 06/06/18 10:52:00 CDT  ?  2.?DMII (diabetes mellitus, type 2)  ?CMP and hemoglobin A1c today  Ordered:  Office/Outpatient Visit Level 4 Established [id number] PC, [place]  DMII (diabetes mellitus, type 2)  HTN (hypertension)  Allergic rhinitis, [facility], 06/06/18 10:52:00 CDT  ?  3.?HTN (hypertension)  ?Continue lisinopril 10 mg daily and monitor blood pressure regularly  Ordered:  Office/Outpatient Visit Level 4 Established [id number] PC, [place]  DMII (diabetes mellitus, type 2)  HTN (hypertension)  Allergic rhinitis, [facility], 06/06/18 10:52:00 CDT  ?  4.?Allergic rhinitis  ?Start  antihistamine daily  Continue Flonase 2 sprays each nostril daily  Ordered:  Office/Outpatient Visit Level 4 Established [id number] PC, [place]  DMII (diabetes mellitus, type 2)  HTN (hypertension)  Allergic rhinitis, [facility], 06/06/18 10:52:00 CDT  ?  Orders:  mupirocin topical, 1 dieudonne, TOP, TID, X 7 day(s), # 22 gm, 0 Refill(s), Pharmacy: [facility] [place]   Problem List/Past Medical History  Ongoing  Allergic rhinitis  DMII (diabetes mellitus, type 2)  GERD - Gastro-esophageal reflux disease  HTN (hypertension)  Hypercholesterolemia  LVH (left ventricular hypertrophy)  Historical  No qualifying data  Procedure/Surgical History  cyst removed finger.  Medications  aspirin 81 mg oral Delayed Release (EC) tablet, 81 mg= 1 tab(s), Oral, Daily  lisinopril 10 mg oral tablet, 10 mg= 1 tab(s), Oral, Daily, 3 refills  metFORMIN 1000 mg oral tablet, 1000 mg= 1 tab(s), Oral, BID, 3 refills  mupirocin 2% topical oint, 1 dieudonne, TOP, TID  omeprazole 40 mg oral DR capsule, 40 mg= 1 cap(s), Oral, Daily, 3 refills  rosuvastatin 20 mg oral tablet, 20 mg= 1 tab(s), Oral, Daily, 3 refills  Allergies  No Known Allergies  Social History  Alcohol  Beer, Liquor, 1-2 times per year, 02/22/2018  Employment/School  Employed, Work/School description: network operations., 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  CAD - Coronary artery disease: Mother.  Diabetes mellitus type 2: Mother and Father.  Hypertension.: Father.  Pulmonary embolism: Father.  Immunizations  Vaccine Date Status   tetanus/diphtheria/pertussis, acel(Tdap) 08/10/2017 Recorded   Health Maintenance  Health Maintenance  ???Pending?(in the next year)  ??? ??Due?  ??? ? ? ?Alcohol Misuse Screening due??06/06/18??and every 1??year(s)  ??? ? ? ?Colorectal Screening due??06/06/18??Variable frequency  ??? ? ? ?Depression Screening  due??06/06/18??and every 1??year(s)  ??? ? ? ?Diabetes Maintenance-Eye Exam due??06/06/18??Variable frequency  ??? ? ? ?Diabetes Maintenance-Fasting Lipid Profile due??06/06/18??Variable frequency  ??? ? ? ?Diabetes Maintenance-Foot Exam due??06/06/18??Variable frequency  ??? ? ? ?Diabetes Maintenance-Microalbumin due??06/06/18??Variable frequency  ??? ? ? ?Diabetes Maintenance-Urine Dipstick due??06/06/18??Variable frequency  ??? ? ? ?Hypertension Management-Education due??06/06/18??and every 1??year(s)  ??? ? ? ?Lipid Screening due??06/06/18??Variable frequency  ??? ? ? ?Smoking Cessation due??06/06/18??and every 1??year(s)  ??? ??Due In Future?  ??? ? ? ?Influenza Vaccine not due until??10/02/18??and every 1??year(s)  ??? ? ? ?Diabetes Maintenance-HgbA1c not due until??12/06/18??and every 6??month(s)  ??? ? ? ?Hypertension Management-Blood Pressure not due until??12/06/18??and every 6??month(s)  ??? ? ? ?Aspirin Therapy for CVD Prevention not due until??06/05/19??and every 1??year(s)  ???Satisfied?(in the past 1 year)  ??? ??Satisfied?  ??? ? ? ?Aspirin Therapy for CVD Prevention on??06/05/18.  ??? ? ? ?Blood Pressure Screening on??06/06/18.??Satisfied by Beto Higginbotham Jr, MD  ??? ? ? ?Body Mass Index Check on??06/06/18.??Satisfied by Suzy Thomas  ??? ? ? ?Diabetes Maintenance-HgbA1c on??06/06/18.??Satisfied by Iva Torres  ??? ? ? ?Hypertension Management-Blood Pressure on??06/06/18.??Satisfied by Beto Higginbotham Jr, MD  ??? ? ? ?Obesity Screening on??06/06/18.??Satisfied by Suzy Thomas  ??? ? ? ?Tetanus Vaccine on??08/10/17.??Satisfied by Suzy Thomas  ?  ?

## 2022-05-03 NOTE — HISTORICAL [facility]
This is a historical note converted from Rey. Formatting and pictures may have been removed.  Please reference Rey for original formatting and attached multimedia. Chief Complaint  6 MTH F/U  History of Present Illness  He reports?intermittent edema?bilateral lower extremity?which is manageable with Lasix once or twice weekly.  ?   Continues to experience?pain bilateral?lower legs and feet intermittently. ?Burning in nature.? Manageable with?Lyrica 50 mg twice daily?although patient?feels his pain has increased since he discontinued HCTZ.  ?   Hypogonadism - started on testosterone gel 1.62% 2 pumps daily.? Improvement in fatigue and exercise tolerance.? Pt has not used testosterone gel over the last 3 weeks.?  ?   DM II - Hemoglobin A1c?6.3 in June. ?Patient continues to tolerate?Trulicity?and Metformin well.  DM Eye exam in September.?  ?   Hypertension?previously controlled with lisinopril 20 mg daily.  ?   Pt noticing sweating on scalp when he eats.?  Review of Systems  Constitutional:?No weight loss, no fever, fatigue, no chills, no night sweats,?no weakness  Eyes:?No blurred vision,?no redness,?no drainage,?no ocular?pain  HEENT:?No sore throat,?no ear pain, no sinus pressure, nasal congestion, no rhinorrhea, no postnasal drip  Respiratory:?No cough, no wheezing, no sputum production, no shortness of breath  Cardiovascular:?No chest pain, no palpitations, no dyspnea on exertion,?no orthopnea  Gastrointestinal:?No nausea, no vomiting, no abdominal pain, no diarrhea,?no constipation, no melena,?no hematochezia  Genitourinary:?No dysuria, no hematuria, no frequency, no urgency, no incontinence, no discharge  Musculoskeletal:?No myalgias, no arthralgias, no weakness, no joint effusion, edema  Integumentary:?No rashes, no hives, no itching, no lesions, no jaundice  Neurologic:?No headaches, no numbness, no tingling, no weakness, no dizziness  Psychiatric:?No anxiety, no irritability, no depression,?no  suicidal ideations, no?homicidal ideations,?no delusions, no hallucinations  Endocrine:?No polyuria, no polydipsia, no polyphagia  Hematology:?No bruising, no lymphadenopathy,?no paleness  ?  Physical Exam  Vitals & Measurements  HR:?105(Peripheral)? BP:?142/90? SpO2:?97%?  HT:?185.00?cm? WT:?92.500?kg? BMI:?27.03?  General:?Well developed, Well-nourished, in No acute distress, A&O x 4  Eye:?PERRLA, EOMI, Clear conjunctiva, Eyelids unremarkable  Ears:?Bilateral EAC clear?and?Bilateral TM clear  Nose:? Mucosa boggy, No rhinorrhea, No lesions  O/P:??No?erythema,?No tonsillar hypertrophy,?No tonsillar exudates,?No cobblestoning  Neck:?Soft, Nontender, No thyromegaly, Full range of motion, No cervical lymphadenopathy, No JVD  Cardiovascular:?Regular rate and rhythm, No murmurs, No gallops, No rubs  Respiratory:?Clear to auscultation bilaterally, No wheezes, No rhonchi, No?crackles  Abdomen:? Soft, Nontender, No hepatosplenomegaly, Normal and equal bowel sounds, No masses, No rebound, No guarding  Musculoskeletal:? No tenderness, FROM, No joint abnormality, No clubbing, No cyanosis,No?edema  Neurologic:? No motor or sensory deficits, Reflexes 2+ throughout, CN II-XII grossly intact  Integumentary:?No rashes or skin lesions  ?  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  2.?DMII (diabetes mellitus, type 2)?E11.9  ?Hemoglobin A1c  Ordered:  Clinic Follow up, *Est. 06/08/22 3:00:00 CDT, Wellness Physical, Order for future visit, HTN (hypertension)  DMII (diabetes mellitus, type 2), [facility]  Office/Outpatient Visit Level 4 Established [id number]  PC, Gustatory hyperhidrosis  DMII (diabetes mellitus, type 2)  Allergic rhinitis  HTN (hypertension)  Hypercholesterolemia  Hypogonadism male  Peripheral neuropathy  Lower extremity edema, [facility] - [facility], 12/08/21 10:33:00 CST  ?  3.?Allergic rhinitis?J30.9  ?Recommend daily antihistamine  Ordered:  Office/Outpatient Visit Level 4 Established [id number] PC, Gustatory hyperhidrosis  DMII (diabetes mellitus, type 2)  Allergic rhinitis  HTN (hypertension)  Hypercholesterolemia  Hypogonadism male  Peripheral neuropathy  Lower extremity edema, [facility] - [facility], 12/08/21 10:33:00 CST  ?  4.?HTN (hypertension)?I10  ?CBC and CMP today  Continue to monitor blood pressure and?patient will call within 1 week  Continue lisinopril 20 mg daily?for now  Ordered:  Clinic Follow up, *Est. 06/08/22 3:00:00 CDT, Wellness Physical, Order for future visit, HTN (hypertension)  DMII (diabetes mellitus, type 2), [facility]  Office/Outpatient Visit Level 4 Established [id number] PC, Gustatory hyperhidrosis  DMII (diabetes mellitus, type 2)  Allergic rhinitis  HTN (hypertension)  Hypercholesterolemia  Hypogonadism male  Peripheral neuropathy  Lower extremity edema, [facility] - [facility], 12/08/21 10:33:00 CST  ?  5.?Hypercholesterolemia?E78.00  ?CMP and FLP today  Ordered:  Office/Outpatient Visit Level 4 Established [id number] PC, Gustatory hyperhidrosis  DMII (diabetes mellitus, type 2)  Allergic rhinitis  HTN (hypertension)  Hypercholesterolemia  Hypogonadism male  Peripheral neuropathy  Lower extremity edema, [facility] - [facility], 12/08/21 10:33:00 CST  ?  6.?Hypogonadism male?E29.1  ?Restart?testosterone 1.62% gel apply 2 pumps daily  Testosterone level today  Ordered:  Office/Outpatient Visit Level 4 Established [id number] PC, Gustatory hyperhidrosis  DMII (diabetes mellitus, type 2)  Allergic rhinitis  HTN (hypertension)  Hypercholesterolemia  Hypogonadism male  Peripheral neuropathy  Lower extremity edema, [facility] - [facility],  12/08/21 10:33:00 CST  ?  7.?Peripheral neuropathy?G62.9  ?Continue?Lyrica 50 mg twice daily  Ordered:  Office/Outpatient Visit Level 4 Established [id number] PC, Gustatory hyperhidrosis  DMII (diabetes mellitus, type 2)  Allergic rhinitis  HTN (hypertension)  Hypercholesterolemia  Hypogonadism male  Peripheral neuropathy  Lower extremity edema, [facility] - [facility], 12/08/21 10:33:00 CST  ?  8.?Lower extremity edema?R60.0  ?Continue furosemide 20 mg daily as needed?edema  Ordered:  Office/Outpatient Visit Level 4 Established [id number] PC, Gustatory hyperhidrosis  DMII (diabetes mellitus, type 2)  Allergic rhinitis  HTN (hypertension)  Hypercholesterolemia  Hypogonadism male  Peripheral neuropathy  Lower extremity edema, [facility] - [facility], 12/08/21 10:33:00 CST  ?  Orders:  testosterone, See Instructions, APPLY 2 PUMPS TOPICALLY EVERY MORNING AS DIRECTED, # 75 gm, 5 Refill(s), Pharmacy: [facility], 185, cm, Height/Length Dosing, 12/08/21 10:00:00 CST, 92.5, kg, Weight Dosing, 12/08/21 10:00:00 CST  Referrals  Clinic Follow up, *Est. 06/08/22 3:00:00 CDT, Wellness Physical, Order for future visit, HTN (hypertension)  DMII (diabetes mellitus, type 2), [facility]  Clinic Follow up, Order for future visit   Problem List/Past Medical History  Ongoing  Allergic rhinitis  DMII (diabetes mellitus, type 2)  GERD - Gastro-esophageal reflux disease  Gustatory hyperhidrosis  HTN (hypertension)  Hypercholesterolemia  Hypogonadism male  Iron deficiency anemia  Lower extremity edema  LVH (left ventricular hypertrophy)  Peripheral neuropathy  Historical  No qualifying data  Procedure/Surgical History  Colonoscopy (11/17/2020)  cyst removed finger   Medications  Aleve 220 mg oral tablet, 1 cap(s), Oral, q8hr, PRN  aspirin 81 mg oral Delayed Release (EC) tablet, 81 mg= 1 tab(s), Oral, Daily  Centrum Silver oral tablet, 1 tab(s), Oral, Daily  furosemide 20 mg oral tablet, See Instructions, 2 refills  garlic  lisinopril 20 mg oral  tablet, See Instructions, 3 refills  metFORMIN 1000 mg oral tablet, See Instructions  mupirocin 2% topical ointment, 1 dieudonne, TOP, TID  omeprazole 40 mg oral DR capsule, See Instructions  pregabalin 50 mg oral capsule, See Instructions, 5 refills  rosuvastatin 20 mg oral tablet, See Instructions  testosterone 20.25 mg/actuation (1.62%) transdermal gel, See Instructions, 5 refills  Trulicity Pen 1.5 mg/0.5 mL subcutaneous solution, See Instructions  Allergies  No Known Allergies  Social History  Abuse/Neglect  No, 12/08/2021  Alcohol  Current, Beer, 1-2 times per year, 06/10/2021  Employment/School  Employed, 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 9 year(s). Smokeless Tobacco Use: Smokeless tobacco user within last 30 days. 17 Years (Age started). 26 Years (Age stopped)., 12/08/2021  Family History  Breast cancer: Sister.  CAD - Coronary artery disease: Mother.  Diabetes mellitus type 2: Mother and Father.  Hypertension.: Father.  Pulmonary embolism: Father.  Immunizations  Vaccine Date Status   zoster vaccine, inactivated 06/10/2021 Given   COVID-19 mRNA, LNP-S, PF - Moderna 05/13/2021 Recorded   COVID-19 mRNA, LNP-S, PF - Moderna 04/15/2021 Recorded   pneumococcal 13-valent conjugate vaccine 05/27/2020 Given   pneumococcal 23-polyvalent vaccine 05/20/2019 Given   tetanus/diphtheria/pertussis, acel(Tdap) 08/10/2017 Recorded   Health Maintenance  Health Maintenance  ???Pending?(in the next year)  ??? ??OverDue  ??? ? ? ?Smoking Cessation due??01/01/21??Variable frequency  ??? ??Due?  ??? ? ? ?ADL Screening due??12/08/21??and every 1??year(s)  ??? ? ? ?Depression Screening due??12/08/21??Unknown Frequency  ??? ? ? ?Hypertension Management-Education due??12/08/21??and every 1??year(s)  ??? ? ? ?Lung Cancer Screening due??12/08/21??and every 1??year(s)  ??? ? ? ?Zoster Vaccine due??12/08/21??Unknown Frequency  ??? ??Due  In Future?  ??? ? ? ?Obesity Screening not due until??01/01/22??and every 1??year(s)  ??? ? ? ?Alcohol Misuse Screening not due until??01/02/22??and every 1??year(s)  ??? ? ? ?Diabetes Maintenance-Foot Exam not due until??06/10/22??and every 1??year(s)  ??? ? ? ?Aspirin Therapy for CVD Prevention not due until??06/10/22??and every 1??year(s)  ??? ? ? ?Diabetes Maintenance-Eye Exam not due until??09/15/22??and every 1??year(s)  ???Satisfied?(in the past 1 year)  ??? ??Satisfied?  ??? ? ? ?Alcohol Misuse Screening on??06/10/21.??Satisfied by Anahy Flores MD, Richard A  ??? ? ? ?Aspirin Therapy for CVD Prevention on??06/10/21.??Satisfied by Anahy Flores MD, Richard A??Reason: Expectation Satisfied Elsewhere  ??? ? ? ?Blood Pressure Screening on??12/08/21.??Satisfied by Anahy Flores MD, Richard A  ??? ? ? ?Body Mass Index Check on??12/08/21.??Satisfied by Suzy Thomas LPN  ??? ? ? ?Diabetes Maintenance-Serum Creatinine on??12/08/21.??Satisfied by Michele Jackson  ??? ? ? ?Diabetes Screening on??12/08/21.??Satisfied by Michele Jackson  ??? ? ? ?Hypertension Management-BMP on??12/08/21.??Satisfied by Michele Jackson  ??? ? ? ?Influenza Vaccine on??12/08/21.??Satisfied by Suzy Thomas LPN  ??? ? ? ?Lipid Screening on??12/08/21.??Satisfied by Michele Jackson  ??? ? ? ?Obesity Screening on??12/08/21.??Satisfied by Suzy Thomas LPN  ?

## 2022-05-03 NOTE — HISTORICAL [facility]
This is a historical note converted from Rey. Formatting and pictures may have been removed.  Please reference Rey for original formatting and attached multimedia. History of Present Illness  HbA1C 9.0 in May.? Patient initially started on Jardiance 10 mg daily for 2 weeks and then increase to 25 mg daily.? He has tolerated this well without any side effects. ?Continues to be compliant with metformin?1000 mg twice daily. ?He also feels that his diet has improved.? CBGs initially 110 - 140s?when he followed up in June.? Patient has taken CBGs sporadically?over the last few?weeks. ?Reports?180s to 220s?although unsure if these were fasting CBGs.  Hypertension?improving control with addition of HCTZ 12.5 mg daily to lisinopril?20 mg daily.? Patient tolerating this well without any current side effects.  Over the last week patient reports increased nasal congestion?and rhinorrhea. ?Denies any sinus pressure, fever, chills or cough.  Review of Systems  Constitutional:?No weight loss, no fever, no fatigue, no chills, no night sweats,?no weakness  Eyes:?No blurred vision,?no redness,?no drainage,?no ocular?pain  HEENT:?No sore throat,?no ear pain, no sinus pressure, nasal congestion, rhinorrhea, postnasal drip  Respiratory:?No cough, no wheezing, no sputum production, no shortness of breath  Cardiovascular:?No chest pain, no palpitations, no dyspnea on exertion,?no orthopnea  Gastrointestinal:?No nausea, no vomiting, no abdominal pain, no diarrhea,?no constipation, no melena,?no hematochezia  Genitourinary:?No dysuria, no hematuria, no frequency, no urgency, no incontinence, no discharge  Musculoskeletal:?No myalgias, no arthralgias, no weakness, no joint effusion, no edema  Integumentary:?No rashes, no hives, no itching, no lesions, no jaundice  Neurologic:?No headaches, no numbness, no tingling, no weakness, no dizziness  Psychiatric:?No anxiety, no irritability, no depression,?no suicidal ideations, no?homicidal  ideations,?no delusions, no hallucinations  Endocrine:?No polyuria, no polydipsia, no polyphagia  Hematology:?No bruising, no lymphadenopathy,?no paleness  ?  Physical Exam  Vitals & Measurements  HR:?100(Peripheral)? BP:?122/88?  HT:?185?cm? WT:?93.5?kg? BMI:?27.32?  General:?Well developed, Well-nourished, in No acute distress, A&O x 4  Eye:?PERRLA, EOMI, Clear conjunctiva, Eyelids unremarkable  Ears:?Bilateral EAC clear?and?Bilateral TM clear  Nose:? Mucosa boggy, No rhinorrhea, No lesions  O/P:??No?erythema,?No tonsillar hypertrophy,?No tonsillar exudates,?cobblestoning  Neck:?Soft, Nontender, No thyromegaly, Full range of motion, No cervical lymphadenopathy, No JVD  Cardiovascular:?Regular rate and rhythm, No murmurs, No gallops, No rubs  Respiratory:?Clear to auscultation bilaterally, No wheezes, No rhonchi, No?crackles  Abdomen:? Soft, Nontender, No hepatosplenomegaly, Normal and equal bowel sounds, No masses, No rebound, No guarding  Musculoskeletal:? No tenderness, FROM, No joint abnormality, No clubbing, No cyanosis,No?edema  Neurologic:? No motor or sensory deficits, Reflexes 2+ throughout, CN II-XII grossly intact  Integumentary:?No rashes or skin lesions  ?  Assessment/Plan  1.?DMII (diabetes mellitus, type 2)?E11.9  Ordered:  Clinic Follow up, *Est. 12/03/19 3:00:00 CST, Order for future visit, DMII (diabetes mellitus, type 2)  HTN (hypertension)  Allergic rhinitis, [facility]  Comprehensive Metabolic Panel, Routine collect, 09/03/19 8:58:00 CDT, Blood, Order for future visit, Stop date 09/03/19 8:58:00 CDT, Lab Collect, DMII (diabetes mellitus, type 2), 09/03/19 8:58:00 CDT  Hemoglobin A1c, Routine collect, 09/03/19 9:00:00 CDT, Blood, Order for future visit, Stop date 09/03/19 9:00:00 CDT, Lab Collect, DMII (diabetes mellitus, type 2), 09/03/19 9:00:00 CDT  Office/Outpatient Visit Level 4 Established [id number] PC, DMII (diabetes mellitus, type 2)  HTN (hypertension)  Allergic rhinitis, [facility] -  [facility], 09/03/19 8:58:00 CDT  ?  2.?HTN (hypertension)?I10  ?Continue lisinopril 20 mg daily and HCTZ 12.5 mg daily  Ordered:  Clinic Follow up, *Est. 12/03/19 3:00:00 CST, Order for future visit, DMII (diabetes mellitus, type 2)  HTN (hypertension)  Allergic rhinitis, ink AFP  Office/Outpatient Visit Level 4 Established [id number] PC, DMII (diabetes mellitus, type 2)  HTN (hypertension)  Allergic rhinitis, INK AMB - [facility], 09/03/19 8:58:00 CDT  ?  3.?Allergic rhinitis?J30.9  ?Recommend?Allegra or Zyrtec daily  Ordered:  Clinic Follow up, *Est. 12/03/19 3:00:00 CST, Order for future visit, DMII (diabetes mellitus, type 2)  HTN (hypertension)  Allergic rhinitis, ink AFP  Office/Outpatient Visit Level 4 Established [id number] PC, DMII (diabetes mellitus, type 2)  HTN (hypertension)  Allergic rhinitis, INK AMB - [facility], 09/03/19 8:58:00 CDT  ?  Orders:  Lab Collection Request, 09/03/19 8:58:00 CDT, [facility] AMB - [facility], 09/03/19 8:58:00 CDT  Referrals  Clinic Follow up, *Est. 12/03/19 3:00:00 CST, Order for future visit, DMII (diabetes mellitus, type 2)  HTN (hypertension)  Allergic rhinitis, ink [facility]   Problem List/Past Medical History  Ongoing  Allergic rhinitis  DMII (diabetes mellitus, type 2)  GERD - Gastro-esophageal reflux disease  HTN (hypertension)  Hypercholesterolemia  LVH (left ventricular hypertrophy)  Peripheral neuropathy  Historical  No qualifying data  Procedure/Surgical History  cyst removed finger   Medications  Aleve 220 mg oral tablet, 1 cap(s), Oral, q8hr, PRN  aspirin 81 mg oral Delayed Release (EC) tablet, 81 mg= 1 tab(s), Oral, Daily  Centrum Silver oral tablet, 1 tab(s), Oral, Daily  garlic  hydrochlorothiazide 12.5 mg oral tablet, 12.5 mg= 1 tab(s), Oral, Daily, 2 refills  Jardiance 25 mg oral tablet, 25 mg= 1 tab(s), Oral, qAM, 5 refills  lisinopril 20 mg oral tablet, 20 mg= 1 tab(s), Oral, Daily, 3 refills  Lyrica 50 mg oral capsule, 50 mg= 1 cap(s), Oral, BID, 5 refills  metFORMIN 1000 mg oral  tablet, 1000 mg= 1 tab(s), Oral, BID, 3 refills  omeprazole 40 mg oral DR capsule, 40 mg= 1 cap(s), Oral, Daily, 3 refills  rosuvastatin 20 mg oral tablet, 20 mg= 1 tab(s), Oral, Daily, 3 refills  Allergies  No Known Allergies  Social History  Abuse/Neglect  No, 09/03/2019  Alcohol  Beer, Liquor, 1-2 times per year, 02/22/2018  Employment/School  Employed, Work/School description: network operations., 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  CAD - Coronary artery disease: Mother.  Diabetes mellitus type 2: Mother and Father.  Hypertension.: Father.  Pulmonary embolism: Father.  Immunizations  Vaccine Date Status   pneumococcal 23-polyvalent vaccine 05/20/2019 Given   tetanus/diphtheria/pertussis, acel(Tdap) 08/10/2017 Recorded   Health Maintenance  Health Maintenance  ???Pending?(in the next year)  ??? ??OverDue  ??? ? ? ?Alcohol Misuse Screening due??01/01/19??and every 1??year(s)  ??? ? ? ?Smoking Cessation due??01/01/19??Variable frequency  ??? ? ? ?Aspirin Therapy for CVD Prevention due??06/05/19??and every 1??year(s)  ??? ??Due?  ??? ? ? ?ADL Screening due??09/03/19??and every 1??year(s)  ??? ? ? ?Colorectal Screening due??09/03/19??and every?  ??? ? ? ?Depression Screening due??09/03/19??and every?  ??? ? ? ?Diabetes Maintenance-Eye Exam due??09/03/19??and every?  ??? ? ? ?Diabetes Maintenance-Urine Dipstick due??09/03/19??Variable frequency  ??? ? ? ?Hypertension Management-Education due??09/03/19??and every 1??year(s)  ??? ? ? ?Influenza Vaccine due??09/03/19??and every?  ??? ??Due In Future?  ??? ? ? ?Obesity Screening not due until??01/01/20??and every 1??year(s)  ??? ? ? ?Diabetes Maintenance-Foot Exam not due until??02/03/20??and every?  ??? ? ? ?Diabetes Maintenance-HgbA1c not  due until??05/19/20??and every 1??year(s)  ??? ? ? ?Diabetes Maintenance-Fasting Lipid Profile not due until??05/19/20??and every 1??year(s)  ??? ? ? ?Hypertension Management-BMP not due until??05/19/20??and every 1??year(s)  ??? ? ? ?Diabetes Maintenance-Serum Creatinine not due until??05/20/20??and every 1??year(s)  ??? ? ? ?Smoking Cessation (Diabetes) not due until??06/05/20??and every 2??year(s)  ??? ? ? ?Blood Pressure Screening not due until??09/02/20??and every 1??year(s)  ??? ? ? ?Body Mass Index Check not due until??09/02/20??and every 1??year(s)  ??? ? ? ?Hypertension Management-Blood Pressure not due until??09/02/20??and every 1??year(s)  ???Satisfied?(in the past 1 year)  ??? ??Satisfied?  ??? ? ? ?Blood Pressure Screening on??09/03/19.??Satisfied by Suzy Thomas LPN  ??? ? ? ?Body Mass Index Check on??09/03/19.??Satisfied by Suzy Thomas LPN  ??? ? ? ?Diabetes Maintenance-Serum Creatinine on??05/20/19.??Satisfied by Odessa Huizar  ??? ? ? ?Diabetes Screening on??05/20/19.??Satisfied by Odessa Huizar  ??? ? ? ?Hypertension Management-Blood Pressure on??09/03/19.??Satisfied by Suzy Thomas LPN  ??? ? ? ?Influenza Vaccine on??05/20/19.??Satisfied by Suzy Thomas LPN  ??? ? ? ?Lipid Screening on??05/20/19.??Satisfied by Odessa Huizar  ??? ? ? ?Obesity Screening on??09/03/19.??Satisfied by Suzy Thomas LPN  ?

## 2022-05-03 NOTE — HISTORICAL [facility]
This is a historical note converted from Rey. Formatting and pictures may have been removed.  Please reference Rey for original formatting and attached multimedia. Chief Complaint  DM II F/U  History of Present Illness  Hemoglobin A1c 8.8?in September.? Trulicity?was added at that time?and increased to 1.5 mg weekly in October.? Fasting CBGs 110-140s. Tolerating Trulicity although he does experience mild nausea 2 days after injection.  HTN - controlled with lisinopril 20 mg daily and HCTZ 12.5 mg daily. ?Patient feels?diuretic is more effective?when he takes it?not in?the combination?pill?with lisinopril.  Hypercholesterolemia-LDL?78 in May with Crestor?20 mg daily. ?Patient tolerating well.  Review of Systems  Constitutional:?No weight loss, no fever, no fatigue, no chills, no night sweats,?no weakness  Eyes:?No blurred vision,?no redness,?no drainage,?no ocular?pain  HEENT:?No sore throat,?no ear pain, no sinus pressure, no nasal congestion, no rhinorrhea, no postnasal drip  Respiratory:?No cough, no wheezing, no sputum production, no shortness of breath  Cardiovascular:?No chest pain, no palpitations, no dyspnea on exertion,?no orthopnea  Gastrointestinal:?No nausea, no vomiting, no abdominal pain, no diarrhea,?no constipation, no melena,?no hematochezia  Genitourinary:?No dysuria, no hematuria, no frequency, no urgency, no incontinence, no discharge  Musculoskeletal:?No myalgias, no arthralgias, no weakness, no joint effusion, no edema  Integumentary:?No rashes, no hives, no itching, no lesions, no jaundice  Neurologic:?No headaches, no numbness, no tingling, no weakness, no dizziness  Psychiatric:?No anxiety, no irritability, no depression,?no suicidal ideations, no?homicidal ideations,?no delusions, no hallucinations  Endocrine:?No polyuria, no polydipsia, no polyphagia  Hematology:?No bruising, no lymphadenopathy,?no paleness  ?  Physical Exam  Vitals & Measurements  HR:?100(Peripheral)?  BP:?140/82?  HT:?185?cm? WT:?94.7?kg? BMI:?27.67?  General:?Well developed, Well-nourished, in No acute distress, A&O x 4  Cardiovascular:?Regular rate and rhythm, No murmurs, No gallops, No rubs  Respiratory:?Clear to auscultation bilaterally, No wheezes, No rhonchi, No?crackles  Abdomen:? Soft, Nontender, No hepatosplenomegaly, Normal and equal bowel sounds, No masses, No rebound, No guarding  Musculoskeletal:? No tenderness, FROM, No joint abnormality, No clubbing, No cyanosis,No?edema  Integumentary:?No rashes or skin lesions  Assessment/Plan  1.?DMII (diabetes mellitus, type 2)?E11.9  Ordered:  Clinic Follow up, *Est. 05/22/20 3:00:00 CDT, Wellness Physical, Order for future visit, DMII (diabetes mellitus, type 2)  HTN (hypertension), [email or web address]  Comprehensive Metabolic Panel, Routine collect, 01/22/20 14:27:00 CST, Blood, Order for future visit, Stop date 01/22/20 14:27:00 CST, Lab Collect, DMII (diabetes mellitus, type 2), 01/22/20 14:27:00 CST  Hemoglobin A1c, Routine collect, 01/22/20 14:27:00 CST, Blood, Order for future visit, Stop date 01/22/20 14:27:00 CST, Lab Collect, DMII (diabetes mellitus, type 2), 01/22/20 14:27:00 CST  Office/Outpatient Visit Level 4 Established [id number] PC, DMII (diabetes mellitus, type 2)  HTN (hypertension), [facility], 01/22/20 14:27:00 CST  ?  2.?HTN (hypertension)?I10  ?Continue lisinopril 20 mg daily and HCTZ 12.5 mg daily  Ordered:  Clinic Follow up, *Est. 05/22/20 3:00:00 CDT, Wellness Physical, Order for future visit, DMII (diabetes mellitus, type 2)  HTN (hypertension), [email or web address]  Office/Outpatient Visit Level 4 Established [id number] PC, DMII (diabetes mellitus, type 2)  HTN (hypertension), [facility], 01/22/20 14:27:00 CST  ?  3.?Hypercholesterolemia?E78.00  ?Continue rosuvastatin 20 mg daily  ?  Orders:  hydrochlorothiazide, 12.5 mg = 1 tab(s), Oral, Daily, # 90 tab(s), 3 Refill(s), Pharmacy: [facility] - [place], [place], 185, cm, Height/Length Dosing,  01/22/20 13:43:00 CST, 94.7, kg, Weight Dosing, 01/22/20 13:43:00 CST  Lab Collection Request, 01/22/20 14:27:00 CST, [facility], 01/22/20 14:27:00 CST, DMII (diabetes mellitus, type 2)  Referrals  Clinic Follow up, *Est. 05/22/20 3:00:00 CDT, Wellness Physical, Order for future visit, DMII (diabetes mellitus, type 2)  HTN (hypertension), [facility]   Problem List/Past Medical History  Ongoing  Allergic rhinitis  DMII (diabetes mellitus, type 2)  GERD - Gastro-esophageal reflux disease  HTN (hypertension)  Hypercholesterolemia  LVH (left ventricular hypertrophy)  Peripheral neuropathy  Historical  No qualifying data  Procedure/Surgical History  cyst removed finger   Medications  Aleve 220 mg oral tablet, 1 cap(s), Oral, q8hr, PRN  aspirin 81 mg oral Delayed Release (EC) tablet, 81 mg= 1 tab(s), Oral, Daily  Centrum Silver oral tablet, 1 tab(s), Oral, Daily  garlic  hydrochlorothiazide 12.5 mg oral tablet, 12.5 mg= 1 tab(s), Oral, Daily, 3 refills  lisinopril 20 mg oral tablet, 20 mg= 1 tab(s), Oral, Daily, 1 refills  Lyrica 50 mg oral capsule, 50 mg= 1 cap(s), Oral, BID, 5 refills  metFORMIN 1000 mg oral tablet, 1000 mg= 1 tab(s), Oral, BID, 3 refills  omeprazole 40 mg oral DR capsule, 40 mg= 1 cap(s), Oral, Daily, 3 refills  rosuvastatin 20 mg oral tablet, 20 mg= 1 tab(s), Oral, Daily, 3 refills  Trulicity Pen 1.5 mg/0.5 mL subcutaneous solution, 1.5 mg= 0.5 mL, Subcutaneous, qWeek, 5 refills  Allergies  No Known Allergies  Social History  Abuse/Neglect  No, 01/22/2020  Alcohol  Beer, Liquor, 1-2 times per year, 02/22/2018  Employment/School  Employed, Work/School description: network operations., 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  CAD - Coronary artery disease: Mother.  Diabetes  mellitus type 2: Mother and Father.  Hypertension.: Father.  Pulmonary embolism: Father.  Immunizations  Vaccine Date Status   pneumococcal 23-polyvalent vaccine 05/20/2019 Given   tetanus/diphtheria/pertussis, acel(Tdap) 08/10/2017 Recorded

## 2022-05-03 NOTE — HISTORICAL [facility]
This is a historical note converted from Rey. Formatting and pictures may have been removed.  Please reference Rey for original formatting and attached multimedia. Chief Complaint  3 MTH F/U ANEMIA  History of Present Illness  Patient presents for diabetes follow-up.???Hemoglobin A1c 6.8 in August. He has been out of Trulicity over the last few weeks due to cost.?  Patient was found to have recent development of iron deficiency anemia.?He underwent a GI evaluation recently?with Dr. Salazar.?Colonoscopy unremarkable for any?significant abnormality.? Patient has been taking iron supplementation.  Hypertension controlled with lisinopril 20 mg daily?and HCTZ 12.5 mg daily.  History of neuropathic foot pain. He is recently discontinued?Lyrica and has not noted any significant change?in his symptoms. Overall?pain is currently manageable.  Review of Systems  Constitutional:?No weight loss, no fever, no fatigue, no chills, no night sweats,?no weakness  Eyes:?No blurred vision,?no redness,?no drainage,?no ocular?pain  HEENT:?No sore throat,?no ear pain, no sinus pressure, no nasal congestion, no rhinorrhea, no postnasal drip  Respiratory:?No cough, no wheezing, no sputum production, no shortness of breath  Cardiovascular:?No chest pain, no palpitations, no dyspnea on exertion,?no orthopnea  Gastrointestinal:?No nausea, no vomiting, no abdominal pain, no diarrhea,?no constipation, no melena,?no hematochezia  Genitourinary:?No dysuria, no hematuria, no frequency, no urgency, no incontinence, no discharge  Musculoskeletal:?No myalgias, no arthralgias, no weakness, no joint effusion, no edema  Integumentary:?No rashes, no hives, no itching, no lesions, no jaundice  Neurologic:?No headaches, no numbness, no tingling, no weakness, no dizziness  Psychiatric:?No anxiety, no irritability, no depression,?no suicidal ideations, no?homicidal ideations,?no delusions, no hallucinations  Endocrine:?No polyuria, no polydipsia, no  polyphagia  Hematology:?No bruising, no lymphadenopathy,?no paleness  ?  Physical Exam  Vitals & Measurements  HR:?90(Peripheral)? BP:?138/90?  HT:?185.00?cm? WT:?92.200?kg? BMI:?26.94?  General:?Well developed, Well-nourished, in No acute distress, A&O x 4  Cardiovascular:?Regular rate and rhythm, No murmurs, No gallops, No rubs  Respiratory:?Clear to auscultation bilaterally, No wheezes, No rhonchi, No?crackles  Abdomen:? Soft, Nontender, No hepatosplenomegaly, Normal and equal bowel sounds, No masses, No rebound, No guarding  Musculoskeletal:? No tenderness, FROM, No joint abnormality, No clubbing, No cyanosis,No?edema  Integumentary:?No rashes or skin lesions  Assessment/Plan  1.?DMII (diabetes mellitus, type 2)?E11.9  ?Hemoglobin A1c today  Ordered:  Clinic Follow up, *Est. 04/01/21 3:00:00 CDT, Order for future visit, Iron deficiency anemia  HTN (hypertension)  DMII (diabetes mellitus, type 2), [facility]  Office/Outpatient Visit Level 4 Established [id number] PC, DMII (diabetes mellitus, type 2)  HTN (hypertension)  Iron deficiency anemia, [facility] - [facility], 12/01/20 11:46:00 CST  ?  2.?HTN (hypertension)?I10  ?CBC, CMP today  Ordered:  Clinic Follow up, *Est. 04/01/21 3:00:00 CDT, Order for future visit, Iron deficiency anemia  HTN (hypertension)  DMII (diabetes mellitus, type 2), [facility]  Office/Outpatient Visit Level 4 Established [id number] PC, DMII (diabetes mellitus, type 2)  HTN (hypertension)  Iron deficiency anemia, [facility] - [facility], 12/01/20 11:46:00 CST  ?  3.?Iron deficiency anemia?D50.9  ?CBC today  Ordered:  Clinic Follow up, *Est. 04/01/21 3:00:00 CDT, Order for future visit, Iron deficiency anemia  HTN (hypertension)  DMII (diabetes mellitus, type 2), [facility]  Office/Outpatient Visit Level 4 Established [id number] PC, DMII (diabetes mellitus, type 2)  HTN (hypertension)  Iron deficiency anemia, [facility], 12/01/20 11:46:00 CST  ?  Referrals  Clinic Follow up, *Est. 04/01/21 3:00:00 CDT,  Order for future visit, Iron deficiency anemia  HTN (hypertension)  DMII (diabetes mellitus, type 2), [facility]   Problem List/Past Medical History  Ongoing  Allergic rhinitis  DMII (diabetes mellitus, type 2)  GERD - Gastro-esophageal reflux disease  HTN (hypertension)  Hypercholesterolemia  Iron deficiency anemia  Lower extremity edema  LVH (left ventricular hypertrophy)  Peripheral neuropathy  Historical  No qualifying data  Procedure/Surgical History  Colonoscopy (11/17/2020)  cyst removed finger   Medications  Aleve 220 mg oral tablet, 1 cap(s), Oral, q8hr, PRN  aspirin 81 mg oral Delayed Release (EC) tablet, 81 mg= 1 tab(s), Oral, Daily  Centrum Silver oral tablet, 1 tab(s), Oral, Daily  garlic  hydrochlorothiazide 12.5 mg oral tablet, 12.5 mg= 1 tab(s), Oral, Daily, 3 refills  Lasix 20 mg oral tablet, 20 mg= 1 tab(s), Oral, Daily, PRN, 2 refills  lisinopril 20 mg oral tablet, 20 mg= 1 tab(s), Oral, Daily, 3 refills  Lyrica 50 mg oral capsule, 50 mg= 1 cap(s), Oral, BID, 5 refills,? ?Not taking  metFORMIN 1000 mg oral tablet, 1000 mg= 1 tab(s), Oral, BID, 3 refills  omeprazole 40 mg oral DR capsule, 40 mg= 1 cap(s), Oral, Daily, 3 refills  rosuvastatin 20 mg oral tablet, 20 mg= 1 tab(s), Oral, Daily, 3 refills  Trulicity Pen 1.5 mg/0.5 mL subcutaneous solution, 1.5 mg= 0.5 mL, Subcutaneous, qWeek, 5 refills,? 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  CAD - Coronary artery disease: Mother.  Diabetes mellitus type 2: Mother and Father.  Hypertension.: Father.  Pulmonary  embolism: Father.  Immunizations  Vaccine Date Status   pneumococcal 13-valent conjugate vaccine 05/27/2020 Given   pneumococcal 23-polyvalent vaccine 05/20/2019 Given   tetanus/diphtheria/pertussis, acel(Tdap) 08/10/2017 Recorded   Health Maintenance  Health Maintenance  ???Pending?(in the next year)  ??? ??OverDue  ??? ? ? ?Aspirin Therapy for CVD Prevention due??06/05/19??and every 1??year(s)  ??? ? ? ?Smoking Cessation due??01/01/20??Variable frequency  ??? ? ? ?Alcohol Misuse Screening due??01/02/20??and every 1??year(s)  ??? ? ? ?Diabetes Maintenance-Foot Exam due??02/03/20??Unknown Frequency  ??? ? ? ?Influenza Vaccine due??10/01/20??and every 1??day(s)  ??? ??Due?  ??? ? ? ?ADL Screening due??12/01/20??and every 1??year(s)  ??? ? ? ?Depression Screening due??12/01/20??Unknown Frequency  ??? ? ? ?Diabetes Maintenance-Eye Exam due??12/01/20??Unknown Frequency  ??? ? ? ?Hypertension Management-Education due??12/01/20??and every 1??year(s)  ??? ? ? ?Lung Cancer Screening due??12/01/20??and every 1??year(s)  ??? ? ? ?Zoster Vaccine due??12/01/20??Unknown Frequency  ??? ??Due In Future?  ??? ? ? ?Obesity Screening not due until??01/01/21??and every 1??year(s)  ??? ? ? ?Diabetes Maintenance-Fasting Lipid Profile not due until??05/27/21??and every 1??year(s)  ???Satisfied?(in the past 1 year)  ??? ??Satisfied?  ??? ? ? ?Blood Pressure Screening on??12/01/20.??Satisfied by Suzy Thomas LPN  ??? ? ? ?Body Mass Index Check on??12/01/20.??Satisfied by Suzy Thomas LPN  ??? ? ? ?Colorectal Screening on??11/17/20.??Satisfied by Tara Veliz CMA  ??? ? ? ?Diabetes Maintenance-Serum Creatinine on??12/01/20.??Satisfied by Michele Jackson  ??? ? ? ?Diabetes Screening on??12/01/20.??Satisfied by Michele Jackson  ??? ? ? ?Hypertension Management-BMP on??12/01/20.??Satisfied by Michele Jackson  ??? ? ? ?Influenza Vaccine on??12/01/20.??Satisfied by Suzy Thomas LPN  ??? ? ? ?Lipid Screening on??05/27/20.??Satisfied by  Michele Jackson  ??? ? ? ?Obesity Screening on??12/01/20.??Satisfied by Suzy Thomas LPN  ?

## 2022-05-03 NOTE — HISTORICAL [facility]
This is a historical note converted from Rey. Formatting and pictures may have been removed.  Please reference Rey for original formatting and attached multimedia. Chief Complaint  3 MTH F/U  History of Present Illness  HTN -?? Controlled.??Tolerating lisinopril and HCTZ well without any current side effects. ?Patient prefers?separate?medications?opposed to combination. ?Increased edema at end of the day intermittently.? Edema improved with lasix as needed.  DM II - HbA1C 7.2 5/27/20.? Tolerating Trulicity with mild nausea and Metformin 1000mg BID.?  Hyperchol -?56 5/27/20.? Tolerating Crestor 20mg daily?well without any side effects  GERD -controlled with?omeprazole 40 mg daily  Peripheral Neuropathy - manageable with?Lyrica 50 mg twice daily  Ulceration?within a callus?under right?first toe noted at last visit in May.? Referred to Dr. Barnes.? Pt has had 4 debridement done.? Overall gradually healing.?  H/H 11.8/35.4 at last visit.? FOBT x 3.? Pt taking iron supplement.?  Review of Systems  Constitutional:?No weight loss, no fever, no fatigue, no chills, no night sweats,?no weakness  Eyes:?No blurred vision,?no redness,?no drainage,?no ocular?pain  HEENT:?No sore throat,?no ear pain, no sinus pressure, no nasal congestion, no rhinorrhea, no postnasal drip  Respiratory:?No cough, no wheezing, no sputum production, no shortness of breath  Cardiovascular:?No chest pain, no palpitations, no dyspnea on exertion,?no orthopnea  Gastrointestinal:?No nausea, no vomiting, no abdominal pain, no diarrhea,?no constipation, no melena,?no hematochezia  Genitourinary:?No dysuria, no hematuria, no frequency, no urgency, no incontinence, no discharge  Musculoskeletal:?No myalgias, no arthralgias, no weakness, no joint effusion, no edema  Integumentary:?No rashes, no hives, no itching, no lesions, no jaundice  Neurologic:?No headaches, no numbness, no tingling, no weakness, no dizziness  Psychiatric:?No anxiety, no  irritability, no depression,?no suicidal ideations, no?homicidal ideations,?no delusions, no hallucinations  Endocrine:?No polyuria, no polydipsia, no polyphagia  Hematology:?No bruising, no lymphadenopathy,?no paleness  ?  Physical Exam  Vitals & Measurements  HR:?70(Peripheral)? BP:?116/82?  HT:?185.00?cm? WT:?96.200?kg? BMI:?28.11?  General:?Well developed, Well-nourished, in No acute distress, A&O x 4  Cardiovascular:?Regular rate and rhythm, No murmurs, No gallops, No rubs  Respiratory:?Clear to auscultation bilaterally, No wheezes, No rhonchi, No?crackles  Abdomen:? Soft, Nontender, No hepatosplenomegaly, Normal and equal bowel sounds, No masses, No rebound, No guarding  Musculoskeletal:? No tenderness, FROM, No joint abnormality, No clubbing, No cyanosis,No?edema  Integumentary:?No rashes or skin lesions  Assessment/Plan  1.?DMII (diabetes mellitus, type 2)?E11.9  Ordered:  Clinic Follow up, *Est. 11/10/20 3:00:00 CST, Order for future visit, DMII (diabetes mellitus, type 2)  HTN (hypertension)  Iron deficiency anemia  GERD - Gastro-esophageal reflux disease  Hypercholesterolemia  Lower extremity edema  Peripheral neuropathy  Callo...  Hemoglobin A1c, Routine collect, 08/10/20 8:45:00 CDT, Blood, Order for future visit, Stop date 08/10/20 8:45:00 CDT, Lab Collect, DMII (diabetes mellitus, type 2), 08/10/20 8:45:00 CDT  Lab Collection Request, 08/10/20 8:45:00 CDT, [facility] - [facility], 08/10/20 8:45:00 CDT, DMII (diabetes mellitus, type 2)  Office/Outpatient Visit Level 4 Established [id number] PC, DMII (diabetes mellitus, type 2)  HTN (hypertension)  Iron deficiency anemia  GERD - Gastro-esophageal reflux disease  Hypercholesterolemia  Lower extremity edema  Peripheral neuropathy  Callous ulcer, [facility] - [facility], 08/10/20 8:45:00 CDT  ?  2.?HTN (hypertension)?I10  ?Well-controlled with lisinopril 20 mg daily and HCTZ 12.5 mg daily  Ordered:  Clinic Follow up, *Est. 11/10/20 3:00:00 CST, Order for future  visit, DMII (diabetes mellitus, type 2)  HTN (hypertension)  Iron deficiency anemia  GERD - Gastro-esophageal reflux disease  Hypercholesterolemia  Lower extremity edema  Peripheral neuropathy  Callo...  Comprehensive Metabolic Panel, Routine collect, 08/10/20 8:45:00 CDT, Blood, Order for future visit, Stop date 08/10/20 8:45:00 CDT, Lab Collect, HTN (hypertension), 08/10/20 8:45:00 CDT  Office/Outpatient Visit Level 4 Established [id number] PC, DMII (diabetes mellitus, type 2)  HTN (hypertension)  Iron deficiency anemia  GERD - Gastro-esophageal reflux disease  Hypercholesterolemia  Lower extremity edema  Peripheral neuropathy  Callous ulcer, [facility] - [facility], 08/10/20 8:45:00 CDT  ?  3.?Iron deficiency anemia?D50.9  ?Discuss colonoscopy although not currently financially feasible  Ordered:  CBC w/ Auto Diff, Routine collect, 08/10/20 8:45:00 CDT, Blood, Order for future visit, Stop date 08/10/20 8:45:00 CDT, Lab Collect, Iron deficiency anemia, 08/10/20 8:45:00 CDT  Clinic Follow up, *Est. 11/10/20 3:00:00 CST, Order for future visit, DMII (diabetes mellitus, type 2)  HTN (hypertension)  Iron deficiency anemia  GERD - Gastro-esophageal reflux disease  Hypercholesterolemia  Lower extremity edema  Peripheral neuropathy  Callo...  Office/Outpatient Visit Level 4 Established [id number] PC, DMII (diabetes mellitus, type 2)  HTN (hypertension)  Iron deficiency anemia  GERD - Gastro-esophageal reflux disease  Hypercholesterolemia  Lower extremity edema  Peripheral neuropathy  Callous ulcer, [facility] - [facility], 08/10/20 8:45:00 CDT  ?  4.?GERD - Gastro-esophageal reflux disease?K21.9  ?Well-controlled with omeprazole 40 mg daily  Ordered:  Clinic Follow up, *Est. 11/10/20 3:00:00 CST, Order for future visit, DMII (diabetes mellitus, type 2)  HTN (hypertension)  Iron deficiency anemia  GERD - Gastro-esophageal reflux disease  Hypercholesterolemia  Lower extremity edema  Peripheral neuropathy   Callo...  Office/Outpatient Visit Level 4 Established [id number] PC, DMII (diabetes mellitus, type 2)  HTN (hypertension)  Iron deficiency anemia  GERD - Gastro-esophageal reflux disease  Hypercholesterolemia  Lower extremity edema  Peripheral neuropathy  Callous ulcer, [facility] - [facility], 08/10/20 8:45:00 CDT  ?  5.?Hypercholesterolemia?E78.00  ?Continue rosuvastatin 20 mg daily  Ordered:  Clinic Follow up, *Est. 11/10/20 3:00:00 CST, Order for future visit, DMII (diabetes mellitus, type 2)  HTN (hypertension)  Iron deficiency anemia  GERD - Gastro-esophageal reflux disease  Hypercholesterolemia  Lower extremity edema  Peripheral neuropathy  Callo...  Office/Outpatient Visit Level 4 Established [id number] PC, DMII (diabetes mellitus, type 2)  HTN (hypertension)  Iron deficiency anemia  GERD - Gastro-esophageal reflux disease  Hypercholesterolemia  Lower extremity edema  Peripheral neuropathy  Callous ulcer, [facility] - [facility], 08/10/20 8:45:00 CDT  ?  6.?Lower extremity edema?R60.0  ?Continue?Lasix 20 mg as needed  Ordered:  Clinic Follow up, *Est. 11/10/20 3:00:00 CST, Order for future visit, DMII (diabetes mellitus, type 2)  HTN (hypertension)  Iron deficiency anemia  GERD - Gastro-esophageal reflux disease  Hypercholesterolemia  Lower extremity edema  Peripheral neuropathy  Callo...  Office/Outpatient Visit Level 4 Established [id number] PC, DMII (diabetes mellitus, type 2)  HTN (hypertension)  Iron deficiency anemia  GERD - Gastro-esophageal reflux disease  Hypercholesterolemia  Lower extremity edema  Peripheral neuropathy  Callous ulcer, [facility] - [facility], 08/10/20 8:45:00 CDT  ?  7.?Peripheral neuropathy?G62.9  ?Continue Lyrica 50 mg twice daily  Ordered:  Clinic Follow up, *Est. 11/10/20 3:00:00 CST, Order for future visit, DMII (diabetes mellitus, type 2)  HTN (hypertension)  Iron deficiency anemia  GERD - Gastro-esophageal reflux disease  Hypercholesterolemia  Lower extremity edema   Peripheral neuropathy  Callo...  Office/Outpatient Visit Level 4 Established [id number] PC, DMII (diabetes mellitus, type 2)  HTN (hypertension)  Iron deficiency anemia  GERD - Gastro-esophageal reflux disease  Hypercholesterolemia  Lower extremity edema  Peripheral neuropathy  Callous ulcer, [facility] - [facility], 08/10/20 8:45:00 CDT  ?  8.?Callous ulcer?L98.499  ?Continue podiatry follow-up  Ordered:  Clinic Follow up, *Est. 11/10/20 3:00:00 CST, Order for future visit, DMII (diabetes mellitus, type 2)  HTN (hypertension)  Iron deficiency anemia  GERD - Gastro-esophageal reflux disease  Hypercholesterolemia  Lower extremity edema  Peripheral neuropathy  Callo...  Office/Outpatient Visit Level 4 Established [id number] PC, DMII (diabetes mellitus, type 2)  HTN (hypertension)  Iron deficiency anemia  GERD - Gastro-esophageal reflux disease  Hypercholesterolemia  Lower extremity edema  Peripheral neuropathy  Callous ulcer, [facility] - [facility], 08/10/20 8:45:00 CDT  ?  Referrals  Clinic Follow up, *Est. 11/10/20 3:00:00 CST, Order for future visit, DMII (diabetes mellitus, type 2)  HTN (hypertension)  Iron deficiency anemia  GERD - Gastro-esophageal reflux disease  Hypercholesterolemia  Lower extremity edema  Peripheral neuropathy  Callo...   Problem List/Past Medical History  Ongoing  Allergic rhinitis  DMII (diabetes mellitus, type 2)  GERD - Gastro-esophageal reflux disease  HTN (hypertension)  Hypercholesterolemia  Iron deficiency anemia  Lower extremity edema  LVH (left ventricular hypertrophy)  Peripheral neuropathy  Historical  No qualifying data  Procedure/Surgical History  cyst removed finger   Medications  Aleve 220 mg oral tablet, 1 cap(s), Oral, q8hr, PRN  aspirin 81 mg oral Delayed Release (EC) tablet, 81 mg= 1 tab(s), Oral, Daily  Centrum Silver oral tablet, 1 tab(s), Oral, Daily  Fergon 240 mg (27 mg elemental iron) oral tablet, 240 mg= 1 tab(s), Oral, Daily  garlic  hydrochlorothiazide 12.5 mg  oral tablet, 12.5 mg= 1 tab(s), Oral, Daily, 3 refills  Lasix 20 mg oral tablet, 20 mg= 1 tab(s), Oral, Daily, PRN, 2 refills  lisinopril 20 mg oral tablet, 20 mg= 1 tab(s), Oral, Daily, 3 refills  Lyrica 50 mg oral capsule, 50 mg= 1 cap(s), Oral, BID, 5 refills  metFORMIN 1000 mg oral tablet, 1000 mg= 1 tab(s), Oral, BID, 3 refills  omeprazole 40 mg oral DR capsule, 40 mg= 1 cap(s), Oral, Daily, 3 refills  rosuvastatin 20 mg oral tablet, 20 mg= 1 tab(s), Oral, Daily, 3 refills  Trulicity Pen 1.5 mg/0.5 mL subcutaneous solution, 1.5 mg= 0.5 mL, Subcutaneous, qWeek, 5 refills  Allergies  No Known Allergies  Social History  Abuse/Neglect  No, 08/10/2020  Alcohol  Beer, Liquor, 1-2 times per year, 02/22/2018  Employment/School  Employed, Work/School description: [employer] 06/06/2018  Exercise  Exercise frequency: 1-2 times/week., 06/06/2018  Tobacco  Smoker, current status unknown, Oral, No, 08/10/2020  Smoker, current status unknown, Oral, No, 05/20/2019  Oral, Started age 12 Years., 02/22/2018  Family History  Breast cancer: Sister.  CAD - Coronary artery disease: Mother.  Diabetes mellitus type 2: Mother and Father.  Hypertension.: Father.  Pulmonary embolism: Father.  Immunizations  Vaccine Date Status   pneumococcal 13-valent conjugate vaccine 05/27/2020 Given   pneumococcal 23-polyvalent vaccine 05/20/2019 Given   tetanus/diphtheria/pertussis, acel(Tdap) 08/10/2017 Recorded   Health Maintenance  Health Maintenance  ???Pending?(in the next year)  ??? ??OverDue  ??? ? ? ?Diabetes Maintenance-Microalbumin due??and every?  ??? ? ? ?Aspirin Therapy for CVD Prevention due??06/05/19??and every 1??year(s)  ??? ? ? ?Smoking Cessation due??01/01/20??Variable frequency  ??? ? ? ?Alcohol Misuse Screening due??01/02/20??and every 1??year(s)  ??? ? ? ?Diabetes Maintenance-Foot Exam due??02/03/20??and every?  ??? ??Due?  ??? ? ? ?ADL Screening due??08/10/20??and every 1??year(s)  ??? ? ? ?Depression Screening  due??08/10/20??and every?  ??? ? ? ?Diabetes Maintenance-Eye Exam due??08/10/20??and every?  ??? ? ? ?Hypertension Management-Education due??08/10/20??and every 1??year(s)  ??? ? ? ?Influenza Vaccine due??08/10/20??and every?  ??? ? ? ?Lung Cancer Screening due??08/10/20??and every 1??year(s)  ??? ? ? ?Zoster Vaccine due??08/10/20??and every?  ??? ??Due In Future?  ??? ? ? ?Obesity Screening not due until??01/01/21??and every 1??year(s)  ??? ? ? ?Diabetes Maintenance-HgbA1c not due until??05/27/21??and every 1??year(s)  ??? ? ? ?Hypertension Management-BMP not due until??05/27/21??and every 1??year(s)  ??? ? ? ?Diabetes Maintenance-Serum Creatinine not due until??05/27/21??and every 1??year(s)  ??? ? ? ?Diabetes Maintenance-Fasting Lipid Profile not due until??05/27/21??and every 1??year(s)  ??? ? ? ?Colorectal Screening not due until??07/01/21??and every 1??year(s)  ???Satisfied?(in the past 1 year)  ??? ??Satisfied?  ??? ? ? ?Blood Pressure Screening on??08/10/20.??Satisfied by Suzy Thomas LPN  ??? ? ? ?Body Mass Index Check on??08/10/20.??Satisfied by Suzy Thomas LPN  ??? ? ? ?Colorectal Screening on??07/01/20.??Satisfied by Michele Jackson  ??? ? ? ?Diabetes Maintenance-Serum Creatinine on??05/27/20.??Satisfied by Michele Jackson  ??? ? ? ?Diabetes Screening on??05/27/20.??Satisfied by Michele Jackson  ??? ? ? ?Hypertension Management-Blood Pressure on??08/10/20.??Satisfied by Suzy Thomas LPN  ??? ? ? ?Influenza Vaccine on??01/22/20.??Satisfied by Suzy Thomas LPN  ??? ? ? ?Lipid Screening on??05/27/20.??Satisfied by Michele Jackson  ??? ? ? ?Obesity Screening on??08/10/20.??Satisfied by Suzy Thomas LPN  ?

## 2022-05-03 NOTE — HISTORICAL [facility]
This is a historical note converted from Rey. Formatting and pictures may have been removed.  Please reference Rey for original formatting and attached multimedia. Chief Complaint  CPX FASTING  History of Present Illness  55?year old male presents for wellness visit.  Blood Pressure - 128/78  BMI - 26.27  Immunizations - Discussed Shingrix  Prostate?Cancer Screening - PSA and DENNIS today  Colon Cancer Screening - schedule colonoscopy 11/17/20 with Dr. Salazar repeat in 10 years  Diet - Average  Exercise - minimal walking while at work  HTN - Controlled with?lisinopril and HCTZ well without any current side effects. ?Patient prefers?separate?medications?opposed to combination. ?Increased edema at end of the day intermittently.  DM II - HbA1C?6.5 12/1/20.? Tolerating Trulicity and Metformin 1000mg BID well.?  Hyperchol -?56 5/27/20.? ?Tolerating Crestor 20mg daily?well without any side effects  GERD -controlled with?omeprazole 40 mg daily  Peripheral Neuropathy - manageable with?Lyrica 50 mg twice daily  LE edema intermittently responds to lasix as needed which he require once weekly.  AR - improved controlled with Flonase.?  Patient does report over the last few months decreased motivation, fatigue,?mild irritability,?mild depression.  Review of Systems  Constitutional:?No weight loss, no fever, fatigue, no chills, no night sweats,?no weakness  Eyes:?No blurred vision,?no redness,?no drainage,?no ocular?pain  HEENT:?No sore throat,?no ear pain, no sinus pressure, no nasal congestion, no rhinorrhea, no postnasal drip  Respiratory:?No cough, no wheezing, no sputum production, no shortness of breath  Cardiovascular:?No chest pain, no palpitations, no dyspnea on exertion,?no orthopnea  Gastrointestinal:?No nausea, no vomiting, no abdominal pain, no diarrhea,?no constipation, no melena,?no hematochezia  Genitourinary:?No dysuria, no hematuria, no frequency, no urgency, no incontinence, no discharge  Musculoskeletal:?No  myalgias, no arthralgias, no weakness, no joint effusion, no edema  Integumentary:?No rashes, no hives, no itching, no lesions, no jaundice  Neurologic:?No headaches, no numbness, no tingling, no weakness, no dizziness  Psychiatric:?No anxiety, no irritability, no depression,?no suicidal ideations, no?homicidal ideations,?no delusions, no hallucinations  Endocrine:?No polyuria, no polydipsia, no polyphagia  Hematology:?No bruising, no lymphadenopathy,?no paleness  ?  Physical Exam  Vitals & Measurements  HR:?103(Peripheral)? BP:?128/78? SpO2:?97%?  HT:?185.00?cm? WT:?89.900?kg? BMI:?26.27?  General:?Well developed, Well-nourished, in No acute distress, A&O x 4  Eye:?PERRLA, EOMI, Clear conjunctiva, Eyelids unremarkable  Ears:?Bilateral EAC clear?and?Bilateral TM clear  Nose:? Mucosa normal, No rhinorrhea, No lesions  O/P:??No?erythema,?No tonsillar hypertrophy,?No tonsillar exudates,?No cobblestoning  Neck:?Soft, Nontender, No thyromegaly, Full range of motion, No cervical lymphadenopathy, No JVD  Cardiovascular:?Regular rate and rhythm, No murmurs, No gallops, No rubs  Respiratory:?Clear to auscultation bilaterally, No wheezes, No rhonchi, No?crackles  Abdomen:? Soft, Nontender, No hepatosplenomegaly, Normal and equal bowel sounds, No masses, No rebound, No guarding  Musculoskeletal:? No tenderness, FROM, No joint abnormality, No clubbing, No cyanosis,No?edema  Neurologic:? No motor or sensory deficits, Reflexes 2+ throughout, CN II-XII grossly intact  Integumentary:?Xerosis bilateral?legs and feet  DENNIS - No anal growths or lesions, No rectal masses, Normal sphincter tone, No prostate nodules,? Symmetric, No tenderness,? prostate firm, prostate [id number]  Psychiatric:?Good insight,?appropriate thought process, normal affect,?appropriate?speech,  non-suicidal, cooperative, appropriate judgment  Assessment/Plan  1.?Wellness examination?Z00.00  ?Discussed the?importance of maintaining a balanced diet and regular  exercise  CBC, CMP, FLP, TSH today  Ordered:  Comprehensive Metabolic Panel, Routine collect, 06/10/21 9:59:00 CDT, Blood, Stop date 06/10/21 9:59:00 CDT, Lab Collect, Wellness examination, 06/10/21 9:59:00 CDT  Lipid Panel, Routine collect, 06/10/21 9:59:00 CDT, Blood, Stop date 06/10/21 9:59:00 CDT, Lab Collect, Wellness examination, 06/10/21 9:59:00 CDT  Preventative Health Care Est 40-64 years [id number] PC, Wellness examination, [facility] - [facility], 06/10/21 9:46:00 CDT  ?  2.?DMII (diabetes mellitus, type 2)?E11.9  ?Hemoglobin A1c?and microalbumin today  Ordered:  Clinic Follow up, *Est. 12/10/21 9:30:00 CST, Order for future visit, HTN (hypertension), [facility]  ?  3.?HTN (hypertension)?I10  ?Continue?lisinopril 20 mg daily?and HCTZ 12.5 mg daily  Ordered:  Clinic Follow up, *Est. 12/10/21 9:30:00 CST, Order for future visit, HTN (hypertension), [facility] AFP  ?  4.?Hypercholesterolemia?E78.00  ?CMP and FLP today  Ordered:  Clinic Follow up, *Est. 12/10/21 9:30:00 CST, Order for future visit, HTN (hypertension), [facility]Link AFP  ?  5.?GERD - Gastro-esophageal reflux disease?K21.9  ?Well-controlled with omeprazole 40 mg daily  ?  6.?Lower extremity edema?R60.0  ?Continue Lasix 20 mg daily as needed  ?  7.?Peripheral neuropathy?G62.9  ?Continue Lyrica 50 mg twice daily  ?  8.?Encounter for immunization?Z23  ?Shingrix?IM today  ?  9.?Allergic rhinitis?J30.9  ?Continue Flonase 2 sprays each nostril daily  ?  10.?Prostate cancer screening?Z12.5  ?PSA and DENNIS today  ?  11.?Fatigue?R53.83  ?Testosterone level today  Discussed possibility of mild depression. ?Discussed treatment options. ?Patient?feels it is currently manageable and will?continue to monitor closely. ?If symptoms worsen?patient will?follow up.  ?  Referrals  Clinic Follow up, *Est. 12/10/21 9:30:00 CST, Order for future visit, HTN (hypertension), [facility]Link [facility]   Problem List/Past Medical History  Ongoing  Allergic rhinitis  DMII (diabetes mellitus, type 2)  GERD -  Gastro-esophageal reflux disease  HTN (hypertension)  Hypercholesterolemia  Iron deficiency anemia  Lower extremity edema  LVH (left ventricular hypertrophy)  Peripheral neuropathy  Historical  No qualifying data  Procedure/Surgical History  Colonoscopy (11/17/2020)  cyst removed finger   Medications  Aleve 220 mg oral tablet, 1 cap(s), Oral, q8hr, PRN  aspirin 81 mg oral Delayed Release (EC) tablet, 81 mg= 1 tab(s), Oral, Daily  Centrum Silver oral tablet, 1 tab(s), Oral, Daily  furosemide 20 mg oral tablet, See Instructions  garlic  hydrochlorothiazide 12.5 mg oral tablet, 12.5 mg= 1 tab(s), Oral, Daily, 3 refills  lisinopril 20 mg oral tablet, See Instructions  Lyrica 50 mg oral capsule, 50 mg= 1 cap(s), Oral, BID, 5 refills  metFORMIN 1000 mg oral tablet, See Instructions  omeprazole 40 mg oral DR capsule, See Instructions  rosuvastatin 20 mg oral tablet, 20 mg= 1 tab(s), Oral, Daily, 3 refills  Trulicity Pen 1.5 mg/0.5 mL subcutaneous solution, 1.5 mg= 0.5 mL, Subcutaneous, qWeek, 5 refills  Allergies  No Known Allergies  Social History  Abuse/Neglect  No, 06/10/2021  Alcohol  Current, Beer, 1-2 times per year, 06/10/2021  Employment/School  Employed, 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 9 year(s). Smokeless Tobacco Use: Smokeless tobacco user within last 30 days. 17 Years (Age started). 26 Years (Age stopped)., 06/10/2021  Family History  Breast cancer: Sister.  CAD - Coronary artery disease: Mother.  Diabetes mellitus type 2: Mother and Father.  Hypertension.: Father.  Pulmonary embolism: Father.  Immunizations  Vaccine Date Status   zoster vaccine, inactivated 06/10/2021 Given   COVID-19 mRNA, LNP-S, PF - Moderna 05/13/2021 Recorded   COVID-19 mRNA, LNP-S, PF - Moderna 04/15/2021 Recorded   pneumococcal 13-valent conjugate vaccine 05/27/2020 Given   pneumococcal 23-polyvalent vaccine 05/20/2019 Given    tetanus/diphtheria/pertussis, acel(Tdap) 08/10/2017 Recorded   Health Maintenance  Health Maintenance  ???Pending?(in the next year)  ??? ??OverDue  ??? ? ? ?Aspirin Therapy for CVD Prevention due??06/05/19??and every 1??year(s)  ??? ? ? ?Diabetes Maintenance-Foot Exam due??02/03/20??Unknown Frequency  ??? ? ? ?Influenza Vaccine due??10/01/20??and every 1??day(s)  ??? ? ? ?Smoking Cessation due??01/01/21??Variable frequency  ??? ? ? ?Alcohol Misuse Screening due??01/02/21??and every 1??year(s)  ??? ? ? ?Diabetes Maintenance-Fasting Lipid Profile due??05/27/21??and every 1??year(s)  ??? ??Due?  ??? ? ? ?ADL Screening due??06/10/21??and every 1??year(s)  ??? ? ? ?Depression Screening due??06/10/21??Unknown Frequency  ??? ? ? ?Diabetes Maintenance-Eye Exam due??06/10/21??Unknown Frequency  ??? ? ? ?Hypertension Management-Education due??06/10/21??and every 1??year(s)  ??? ? ? ?Lung Cancer Screening due??06/10/21??and every 1??year(s)  ??? ??Due In Future?  ??? ? ? ?Diabetes Maintenance-HgbA1c not due until??12/01/21??and every 1??year(s)  ??? ? ? ?Hypertension Management-BMP not due until??12/01/21??and every 1??year(s)  ??? ? ? ?Diabetes Maintenance-Serum Creatinine not due until??12/01/21??and every 1??year(s)  ??? ? ? ?Obesity Screening not due until??01/01/22??and every 1??year(s)  ???Satisfied?(in the past 1 year)  ??? ??Satisfied?  ??? ? ? ?Blood Pressure Screening on??06/10/21.??Satisfied by Suzy Thomas LPN  ??? ? ? ?Body Mass Index Check on??06/10/21.??Satisfied by Suzy Thomas LPN  ??? ? ? ?Colorectal Screening on??11/17/20.??Satisfied by Tara Veliz CMA  ??? ? ? ?Diabetes Maintenance-HgbA1c on??06/10/21.??Satisfied by Michele Jackson  ??? ? ? ?Diabetes Screening on??06/10/21.??Satisfied by Michele Jackson  ??? ? ? ?Hypertension Management-BMP on??06/10/21.??Satisfied by Michele Jackson  ??? ? ? ?Influenza Vaccine on??12/01/20.??Satisfied by Suzy Thomas LPN  ??? ? ? ?Obesity Screening  on??06/10/21.??Satisfied by Suzy Thomas LPN  ?

## 2022-06-13 PROBLEM — I51.7 LEFT VENTRICULAR HYPERTROPHY: Status: ACTIVE | Noted: 2022-06-13

## 2022-06-13 PROBLEM — K21.9 GASTROESOPHAGEAL REFLUX DISEASE: Status: ACTIVE | Noted: 2022-06-13

## 2022-06-13 PROBLEM — E78.00 HYPERCHOLESTEROLEMIA: Status: ACTIVE | Noted: 2022-06-13

## 2022-06-13 PROBLEM — I10 HYPERTENSION: Status: ACTIVE | Noted: 2022-06-13

## 2022-06-13 PROBLEM — D50.9 IRON DEFICIENCY ANEMIA: Status: ACTIVE | Noted: 2022-06-13

## 2022-06-13 PROBLEM — E29.1 HYPOGONADISM IN MALE: Status: ACTIVE | Noted: 2022-06-13

## 2022-06-13 PROBLEM — E11.9 TYPE 2 DIABETES MELLITUS: Status: ACTIVE | Noted: 2022-06-13

## 2022-06-13 PROBLEM — R60.0 EDEMA OF LOWER EXTREMITY: Status: ACTIVE | Noted: 2022-06-13

## 2022-06-13 PROBLEM — G62.9 PERIPHERAL NERVE DISEASE: Status: ACTIVE | Noted: 2022-06-13

## 2022-06-13 PROBLEM — L74.52: Status: ACTIVE | Noted: 2022-06-13

## 2022-06-13 PROBLEM — J30.9 ALLERGIC RHINITIS: Status: ACTIVE | Noted: 2022-06-13

## 2023-12-15 PROBLEM — N18.31 CHRONIC KIDNEY DISEASE, STAGE 3A: Status: ACTIVE | Noted: 2023-12-15
